# Patient Record
Sex: FEMALE | Race: BLACK OR AFRICAN AMERICAN | NOT HISPANIC OR LATINO | Employment: OTHER | URBAN - METROPOLITAN AREA
[De-identification: names, ages, dates, MRNs, and addresses within clinical notes are randomized per-mention and may not be internally consistent; named-entity substitution may affect disease eponyms.]

---

## 2017-01-25 ASSESSMENT — MIFFLIN-ST. JEOR
SCORE: 1473.43
SCORE: 1473.43

## 2017-01-26 ENCOUNTER — SURGERY - HEALTHEAST (OUTPATIENT)
Dept: CARDIOLOGY | Facility: CLINIC | Age: 67
End: 2017-01-26

## 2017-01-26 ASSESSMENT — MIFFLIN-ST. JEOR
SCORE: 1471.16
SCORE: 1471.16

## 2017-01-28 ASSESSMENT — MIFFLIN-ST. JEOR
SCORE: 1462.13
SCORE: 1462.13

## 2017-02-01 ENCOUNTER — AMBULATORY - HEALTHEAST (OUTPATIENT)
Dept: CARDIAC REHAB | Facility: CLINIC | Age: 67
End: 2017-02-01

## 2017-02-01 DIAGNOSIS — I21.4 NON-ST ELEVATION MI (NSTEMI) (H): ICD-10-CM

## 2017-02-14 ENCOUNTER — OFFICE VISIT - HEALTHEAST (OUTPATIENT)
Dept: CARDIOLOGY | Facility: CLINIC | Age: 67
End: 2017-02-14

## 2017-02-14 ENCOUNTER — AMBULATORY - HEALTHEAST (OUTPATIENT)
Dept: CARDIAC REHAB | Facility: CLINIC | Age: 67
End: 2017-02-14

## 2017-02-14 DIAGNOSIS — I25.83 CORONARY ARTERY DISEASE DUE TO LIPID RICH PLAQUE: ICD-10-CM

## 2017-02-14 DIAGNOSIS — I25.10 CORONARY ARTERY DISEASE DUE TO LIPID RICH PLAQUE: ICD-10-CM

## 2017-02-14 DIAGNOSIS — E78.2 MIXED HYPERLIPIDEMIA DUE TO TYPE 2 DIABETES MELLITUS (H): ICD-10-CM

## 2017-02-14 DIAGNOSIS — I21.4 NON-ST ELEVATION MI (NSTEMI) (H): ICD-10-CM

## 2017-02-14 DIAGNOSIS — I21.4 NSTEMI (NON-ST ELEVATED MYOCARDIAL INFARCTION) (H): ICD-10-CM

## 2017-02-14 DIAGNOSIS — E11.69 MIXED HYPERLIPIDEMIA DUE TO TYPE 2 DIABETES MELLITUS (H): ICD-10-CM

## 2017-02-14 RX ORDER — GLUCOSAMINE HCL/CHONDROITIN SU 500-400 MG
CAPSULE ORAL
Status: SHIPPED | COMMUNITY
Start: 2013-06-27

## 2017-02-14 RX ORDER — ASPIRIN 81 MG/1
81 TABLET, CHEWABLE ORAL DAILY
Status: SHIPPED | COMMUNITY
Start: 2017-02-14

## 2017-02-14 ASSESSMENT — MIFFLIN-ST. JEOR: SCORE: 1467.07

## 2017-02-16 ENCOUNTER — AMBULATORY - HEALTHEAST (OUTPATIENT)
Dept: CARDIAC REHAB | Facility: CLINIC | Age: 67
End: 2017-02-16

## 2017-02-16 DIAGNOSIS — I21.4 NON-ST ELEVATION MI (NSTEMI) (H): ICD-10-CM

## 2017-02-21 ENCOUNTER — AMBULATORY - HEALTHEAST (OUTPATIENT)
Dept: CARDIAC REHAB | Facility: CLINIC | Age: 67
End: 2017-02-21

## 2017-02-21 DIAGNOSIS — I21.4 NON-ST ELEVATION MI (NSTEMI) (H): ICD-10-CM

## 2017-02-22 ENCOUNTER — RECORDS - HEALTHEAST (OUTPATIENT)
Dept: ADMINISTRATIVE | Facility: OTHER | Age: 67
End: 2017-02-22

## 2017-02-28 ENCOUNTER — AMBULATORY - HEALTHEAST (OUTPATIENT)
Dept: CARDIAC REHAB | Facility: CLINIC | Age: 67
End: 2017-02-28

## 2017-02-28 DIAGNOSIS — I21.4 NON-ST ELEVATION MI (NSTEMI) (H): ICD-10-CM

## 2017-03-02 ENCOUNTER — AMBULATORY - HEALTHEAST (OUTPATIENT)
Dept: CARDIAC REHAB | Facility: CLINIC | Age: 67
End: 2017-03-02

## 2017-03-02 DIAGNOSIS — I21.4 NON-ST ELEVATION MI (NSTEMI) (H): ICD-10-CM

## 2017-03-02 DIAGNOSIS — Z98.62 S/P ANGIOPLASTY: ICD-10-CM

## 2017-03-07 ENCOUNTER — AMBULATORY - HEALTHEAST (OUTPATIENT)
Dept: CARDIAC REHAB | Facility: CLINIC | Age: 67
End: 2017-03-07

## 2017-03-07 DIAGNOSIS — I21.4 NON-ST ELEVATION MI (NSTEMI) (H): ICD-10-CM

## 2017-03-07 DIAGNOSIS — Z98.62 S/P ANGIOPLASTY: ICD-10-CM

## 2017-03-09 ENCOUNTER — AMBULATORY - HEALTHEAST (OUTPATIENT)
Dept: CARDIAC REHAB | Facility: CLINIC | Age: 67
End: 2017-03-09

## 2017-03-09 DIAGNOSIS — I21.4 NON-ST ELEVATION MI (NSTEMI) (H): ICD-10-CM

## 2017-03-09 DIAGNOSIS — Z98.62 S/P ANGIOPLASTY: ICD-10-CM

## 2017-03-14 ENCOUNTER — AMBULATORY - HEALTHEAST (OUTPATIENT)
Dept: CARDIAC REHAB | Facility: CLINIC | Age: 67
End: 2017-03-14

## 2017-03-14 DIAGNOSIS — I21.4 NON-ST ELEVATION MI (NSTEMI) (H): ICD-10-CM

## 2017-03-14 DIAGNOSIS — Z98.62 S/P ANGIOPLASTY: ICD-10-CM

## 2017-03-16 ENCOUNTER — AMBULATORY - HEALTHEAST (OUTPATIENT)
Dept: CARDIAC REHAB | Facility: CLINIC | Age: 67
End: 2017-03-16

## 2017-03-16 DIAGNOSIS — I21.4 NON-ST ELEVATION MI (NSTEMI) (H): ICD-10-CM

## 2017-03-16 DIAGNOSIS — Z98.62 S/P ANGIOPLASTY: ICD-10-CM

## 2017-03-28 ENCOUNTER — AMBULATORY - HEALTHEAST (OUTPATIENT)
Dept: CARDIAC REHAB | Facility: CLINIC | Age: 67
End: 2017-03-28

## 2017-03-28 DIAGNOSIS — I21.4 NON-ST ELEVATION MI (NSTEMI) (H): ICD-10-CM

## 2017-03-28 DIAGNOSIS — Z98.62 S/P ANGIOPLASTY: ICD-10-CM

## 2017-03-30 ENCOUNTER — AMBULATORY - HEALTHEAST (OUTPATIENT)
Dept: CARDIAC REHAB | Facility: CLINIC | Age: 67
End: 2017-03-30

## 2017-03-30 DIAGNOSIS — I21.4 NON-ST ELEVATION MI (NSTEMI) (H): ICD-10-CM

## 2017-03-30 DIAGNOSIS — Z98.62 S/P ANGIOPLASTY: ICD-10-CM

## 2017-04-04 ENCOUNTER — AMBULATORY - HEALTHEAST (OUTPATIENT)
Dept: CARDIAC REHAB | Facility: CLINIC | Age: 67
End: 2017-04-04

## 2017-04-04 DIAGNOSIS — I21.4 NON-ST ELEVATION MI (NSTEMI) (H): ICD-10-CM

## 2017-04-04 DIAGNOSIS — Z98.62 S/P ANGIOPLASTY: ICD-10-CM

## 2017-04-11 ENCOUNTER — AMBULATORY - HEALTHEAST (OUTPATIENT)
Dept: CARDIAC REHAB | Facility: CLINIC | Age: 67
End: 2017-04-11

## 2017-04-11 DIAGNOSIS — Z98.62 S/P ANGIOPLASTY: ICD-10-CM

## 2017-04-11 DIAGNOSIS — I21.4 NON-ST ELEVATION MI (NSTEMI) (H): ICD-10-CM

## 2017-04-13 ENCOUNTER — AMBULATORY - HEALTHEAST (OUTPATIENT)
Dept: CARDIAC REHAB | Facility: CLINIC | Age: 67
End: 2017-04-13

## 2017-04-13 DIAGNOSIS — I21.4 NON-ST ELEVATION MI (NSTEMI) (H): ICD-10-CM

## 2017-04-13 DIAGNOSIS — Z98.62 S/P ANGIOPLASTY: ICD-10-CM

## 2017-04-18 ENCOUNTER — AMBULATORY - HEALTHEAST (OUTPATIENT)
Dept: CARDIAC REHAB | Facility: CLINIC | Age: 67
End: 2017-04-18

## 2017-04-18 DIAGNOSIS — Z98.62 S/P ANGIOPLASTY: ICD-10-CM

## 2017-04-18 DIAGNOSIS — I21.4 NON-ST ELEVATION MI (NSTEMI) (H): ICD-10-CM

## 2017-04-25 ENCOUNTER — OFFICE VISIT - HEALTHEAST (OUTPATIENT)
Dept: CARDIOLOGY | Facility: CLINIC | Age: 67
End: 2017-04-25

## 2017-04-25 ENCOUNTER — AMBULATORY - HEALTHEAST (OUTPATIENT)
Dept: CARDIAC REHAB | Facility: CLINIC | Age: 67
End: 2017-04-25

## 2017-04-25 DIAGNOSIS — I21.4 NON-ST ELEVATION MI (NSTEMI) (H): ICD-10-CM

## 2017-04-25 DIAGNOSIS — Z98.62 S/P ANGIOPLASTY: ICD-10-CM

## 2017-04-25 DIAGNOSIS — I25.10 CORONARY ARTERY DISEASE INVOLVING NATIVE CORONARY ARTERY OF NATIVE HEART WITHOUT ANGINA PECTORIS: ICD-10-CM

## 2017-04-25 DIAGNOSIS — E11.69 MIXED HYPERLIPIDEMIA DUE TO TYPE 2 DIABETES MELLITUS (H): ICD-10-CM

## 2017-04-25 DIAGNOSIS — Z72.0 TOBACCO USE: ICD-10-CM

## 2017-04-25 DIAGNOSIS — E78.2 MIXED HYPERLIPIDEMIA DUE TO TYPE 2 DIABETES MELLITUS (H): ICD-10-CM

## 2017-04-25 ASSESSMENT — MIFFLIN-ST. JEOR: SCORE: 1459.36

## 2017-04-27 ENCOUNTER — AMBULATORY - HEALTHEAST (OUTPATIENT)
Dept: CARDIAC REHAB | Facility: CLINIC | Age: 67
End: 2017-04-27

## 2017-04-27 DIAGNOSIS — Z98.62 S/P ANGIOPLASTY: ICD-10-CM

## 2017-04-27 DIAGNOSIS — I21.4 NON-ST ELEVATION MI (NSTEMI) (H): ICD-10-CM

## 2017-05-04 ENCOUNTER — AMBULATORY - HEALTHEAST (OUTPATIENT)
Dept: CARDIAC REHAB | Facility: CLINIC | Age: 67
End: 2017-05-04

## 2017-05-04 DIAGNOSIS — I21.4 NON-ST ELEVATION MI (NSTEMI) (H): ICD-10-CM

## 2017-05-04 DIAGNOSIS — Z98.62 S/P ANGIOPLASTY: ICD-10-CM

## 2017-09-15 ENCOUNTER — COMMUNICATION - HEALTHEAST (OUTPATIENT)
Dept: CARDIOLOGY | Facility: CLINIC | Age: 67
End: 2017-09-15

## 2017-09-15 DIAGNOSIS — I25.10 CORONARY ATHEROSCLEROSIS OF NATIVE CORONARY ARTERY: ICD-10-CM

## 2019-03-28 ENCOUNTER — RECORDS - HEALTHEAST (OUTPATIENT)
Dept: ADMINISTRATIVE | Facility: OTHER | Age: 69
End: 2019-03-28

## 2019-03-28 ENCOUNTER — HOSPITAL ENCOUNTER (OUTPATIENT)
Dept: RADIOLOGY | Facility: CLINIC | Age: 69
Discharge: HOME | End: 2019-03-28
Attending: INTERNAL MEDICINE

## 2019-03-28 DIAGNOSIS — F17.210 CIGARETTE SMOKER: ICD-10-CM

## 2019-03-28 DIAGNOSIS — R05.9 COUGH: ICD-10-CM

## 2019-05-02 ENCOUNTER — RECORDS - HEALTHEAST (OUTPATIENT)
Dept: ADMINISTRATIVE | Facility: OTHER | Age: 69
End: 2019-05-02

## 2019-05-02 ENCOUNTER — HOSPITAL ENCOUNTER (OUTPATIENT)
Dept: RADIOLOGY | Facility: CLINIC | Age: 69
Discharge: HOME | End: 2019-05-02
Attending: INTERNAL MEDICINE

## 2019-05-02 DIAGNOSIS — J18.9 PNEUMONIA: ICD-10-CM

## 2019-05-02 DIAGNOSIS — Z09 FOLLOW UP: ICD-10-CM

## 2020-01-13 ENCOUNTER — COMMUNICATION - HEALTHEAST (OUTPATIENT)
Dept: SCHEDULING | Facility: CLINIC | Age: 70
End: 2020-01-13

## 2020-07-07 ENCOUNTER — OFFICE VISIT - HEALTHEAST (OUTPATIENT)
Dept: FAMILY MEDICINE | Facility: CLINIC | Age: 70
End: 2020-07-07

## 2020-07-07 DIAGNOSIS — I10 BENIGN ESSENTIAL HYPERTENSION: ICD-10-CM

## 2020-07-07 DIAGNOSIS — J44.9 CHRONIC OBSTRUCTIVE PULMONARY DISEASE, UNSPECIFIED COPD TYPE (H): ICD-10-CM

## 2020-07-07 DIAGNOSIS — J45.21 INTERMITTENT ASTHMA WITH ACUTE EXACERBATION, UNSPECIFIED ASTHMA SEVERITY: ICD-10-CM

## 2020-07-11 ENCOUNTER — AMBULATORY - HEALTHEAST (OUTPATIENT)
Dept: FAMILY MEDICINE | Facility: CLINIC | Age: 70
End: 2020-07-11

## 2020-07-11 DIAGNOSIS — J45.21 INTERMITTENT ASTHMA WITH ACUTE EXACERBATION, UNSPECIFIED ASTHMA SEVERITY: ICD-10-CM

## 2020-07-15 ENCOUNTER — COMMUNICATION - HEALTHEAST (OUTPATIENT)
Dept: FAMILY MEDICINE | Facility: CLINIC | Age: 70
End: 2020-07-15

## 2020-09-05 ENCOUNTER — COMMUNICATION - HEALTHEAST (OUTPATIENT)
Dept: SCHEDULING | Facility: CLINIC | Age: 70
End: 2020-09-05

## 2020-09-07 ENCOUNTER — SURGERY - HEALTHEAST (OUTPATIENT)
Dept: CARDIOLOGY | Facility: CLINIC | Age: 70
End: 2020-09-07

## 2020-09-07 ASSESSMENT — MIFFLIN-ST. JEOR
SCORE: 1373.17
SCORE: 1370.44

## 2020-09-08 ENCOUNTER — COMMUNICATION - HEALTHEAST (OUTPATIENT)
Dept: SCHEDULING | Facility: CLINIC | Age: 70
End: 2020-09-08

## 2020-09-08 ASSESSMENT — MIFFLIN-ST. JEOR: SCORE: 1384.51

## 2020-10-08 ENCOUNTER — OFFICE VISIT - HEALTHEAST (OUTPATIENT)
Dept: CARDIOLOGY | Facility: CLINIC | Age: 70
End: 2020-10-08

## 2020-10-08 DIAGNOSIS — E78.2 MIXED HYPERLIPIDEMIA: ICD-10-CM

## 2020-10-08 DIAGNOSIS — I25.10 CORONARY ARTERY DISEASE INVOLVING NATIVE CORONARY ARTERY OF NATIVE HEART, ANGINA PRESENCE UNSPECIFIED: ICD-10-CM

## 2020-10-08 DIAGNOSIS — I10 ESSENTIAL HYPERTENSION: ICD-10-CM

## 2020-10-08 RX ORDER — LORATADINE 10 MG/1
10 TABLET ORAL DAILY
Status: SHIPPED | COMMUNITY
Start: 2020-06-16

## 2020-10-08 ASSESSMENT — MIFFLIN-ST. JEOR: SCORE: 1390.4

## 2021-01-03 ENCOUNTER — RECORDS - HEALTHEAST (OUTPATIENT)
Dept: ADMINISTRATIVE | Facility: OTHER | Age: 71
End: 2021-01-03

## 2021-04-09 ENCOUNTER — TRANSFERRED RECORDS (OUTPATIENT)
Dept: MULTI SPECIALTY CLINIC | Facility: CLINIC | Age: 71
End: 2021-04-09

## 2021-04-09 LAB — HBA1C MFR BLD: 5.9 %

## 2021-05-18 ENCOUNTER — OFFICE VISIT - HEALTHEAST (OUTPATIENT)
Dept: FAMILY MEDICINE | Facility: CLINIC | Age: 71
End: 2021-05-18

## 2021-05-18 DIAGNOSIS — R05.9 COUGH: ICD-10-CM

## 2021-05-18 DIAGNOSIS — M62.838 MUSCLE SPASM: ICD-10-CM

## 2021-05-18 LAB
ANION GAP SERPL CALCULATED.3IONS-SCNC: 13 MMOL/L (ref 5–18)
BUN SERPL-MCNC: 12 MG/DL (ref 8–28)
CALCIUM SERPL-MCNC: 8.7 MG/DL (ref 8.5–10.5)
CHLORIDE BLD-SCNC: 104 MMOL/L (ref 98–107)
CO2 SERPL-SCNC: 24 MMOL/L (ref 22–31)
CREAT SERPL-MCNC: 0.87 MG/DL (ref 0.6–1.1)
GFR SERPL CREATININE-BSD FRML MDRD: >60 ML/MIN/1.73M2
GLUCOSE BLD-MCNC: 168 MG/DL (ref 70–125)
POTASSIUM BLD-SCNC: 3.8 MMOL/L (ref 3.5–5)
SODIUM SERPL-SCNC: 141 MMOL/L (ref 136–145)

## 2021-05-25 ENCOUNTER — RECORDS - HEALTHEAST (OUTPATIENT)
Dept: ADMINISTRATIVE | Facility: CLINIC | Age: 71
End: 2021-05-25

## 2021-05-29 ENCOUNTER — RECORDS - HEALTHEAST (OUTPATIENT)
Dept: ADMINISTRATIVE | Facility: CLINIC | Age: 71
End: 2021-05-29

## 2021-05-30 ENCOUNTER — RECORDS - HEALTHEAST (OUTPATIENT)
Dept: ADMINISTRATIVE | Facility: CLINIC | Age: 71
End: 2021-05-30

## 2021-05-30 VITALS — WEIGHT: 217 LBS | BODY MASS INDEX: 38.44 KG/M2

## 2021-05-30 VITALS — WEIGHT: 221.4 LBS | BODY MASS INDEX: 39.22 KG/M2

## 2021-05-30 VITALS — BODY MASS INDEX: 39.33 KG/M2 | WEIGHT: 222 LBS

## 2021-05-30 VITALS — BODY MASS INDEX: 38.65 KG/M2 | WEIGHT: 218.2 LBS

## 2021-05-30 VITALS — WEIGHT: 220.8 LBS | BODY MASS INDEX: 39.11 KG/M2

## 2021-05-30 VITALS — WEIGHT: 217.4 LBS | BODY MASS INDEX: 38.51 KG/M2

## 2021-05-30 VITALS — WEIGHT: 218 LBS | BODY MASS INDEX: 38.62 KG/M2

## 2021-05-30 VITALS — BODY MASS INDEX: 39.25 KG/M2 | WEIGHT: 221.6 LBS

## 2021-05-30 VITALS
WEIGHT: 215.61 LBS | HEIGHT: 63 IN | HEIGHT: 63 IN | BODY MASS INDEX: 38.2 KG/M2 | WEIGHT: 215.61 LBS | BODY MASS INDEX: 38.2 KG/M2

## 2021-05-30 VITALS — WEIGHT: 222.2 LBS | BODY MASS INDEX: 39.36 KG/M2

## 2021-05-30 VITALS — BODY MASS INDEX: 38.44 KG/M2 | WEIGHT: 217 LBS

## 2021-05-30 VITALS — BODY MASS INDEX: 37.55 KG/M2 | WEIGHT: 212 LBS

## 2021-05-30 VITALS — WEIGHT: 216.6 LBS | BODY MASS INDEX: 38.37 KG/M2

## 2021-05-30 VITALS — WEIGHT: 216.4 LBS | BODY MASS INDEX: 38.33 KG/M2

## 2021-05-30 VITALS — BODY MASS INDEX: 38.86 KG/M2 | WEIGHT: 219.4 LBS

## 2021-05-30 VITALS — BODY MASS INDEX: 38.39 KG/M2 | WEIGHT: 216.7 LBS

## 2021-05-30 VITALS — HEIGHT: 63 IN | WEIGHT: 216.7 LBS | BODY MASS INDEX: 38.39 KG/M2

## 2021-05-30 VITALS — WEIGHT: 215 LBS | HEIGHT: 63 IN | BODY MASS INDEX: 38.09 KG/M2

## 2021-05-30 VITALS — BODY MASS INDEX: 38.79 KG/M2 | WEIGHT: 219 LBS

## 2021-05-30 VITALS — BODY MASS INDEX: 38.33 KG/M2 | WEIGHT: 216.4 LBS

## 2021-06-01 ENCOUNTER — RECORDS - HEALTHEAST (OUTPATIENT)
Dept: ADMINISTRATIVE | Facility: CLINIC | Age: 71
End: 2021-06-01

## 2021-06-02 ENCOUNTER — RECORDS - HEALTHEAST (OUTPATIENT)
Dept: ADMINISTRATIVE | Facility: CLINIC | Age: 71
End: 2021-06-02

## 2021-06-05 VITALS — BODY MASS INDEX: 36.21 KG/M2 | WEIGHT: 185 LBS | HEIGHT: 63 IN | BODY MASS INDEX: 32.77 KG/M2

## 2021-06-05 NOTE — TELEPHONE ENCOUNTER
Seen earlier in ED and they discussed medication and SE     Can't tolerate medication, stomach bloating, not sleeping, Cannot pass gas or stool, Nauseated     Metronidazole and ciprofloxacin     Has had this medication before and it doesn't agree with her    Cannot handle this medication, what medication can she take to counteract it.     Attempted to give the patient Home Care Advice on ways to help with symptoms. Patient states she wants something else to take to help with it.     Triaged to a disposition of Call [facility]. Explained to patient that she will need to either notify her primary care's office or go back to ED.     Reason for Disposition    Caller has URGENT medication question about med that PCP prescribed and triager unable to answer question    Protocols used: MEDICATION QUESTION CALL-A-    Jordana Davila RN Triage Nurse Advisor

## 2021-06-08 NOTE — PROGRESS NOTES
ITP ASSESSMENT   Assessment Day: Initial  Session Number: 1  Precautions: L hand, L leg,   Diagnosis: MI;Other (angioplasty)  Risk Stratification: High  Referring Provider: Dr. Anderson  ITP sent to DR. Anderson, and Chitra Ricardo CNP  EXERCISE  Exercise Assessment: Initial  6 Minute Walk Test   Pre   Pre Exercise HR: 71                  Pre Exercise BP: 122/60   Blood sugar: 125  Peak  Peak HR: 85                 Peak BP: 140/60  Peak feet: 700  Peak O2 SAT: 92  Peak RPE: 14  Peak MPH: 1.33  Symptoms:  Peak Symptoms: hips 4/10  5 mins. Post  5 Min Post HR: 73  5 Min Post BP: 118/64   Blood sugar: 103                         Exercise Plan  Goals Next 30 days  ADL'S: resume light cleaning such as wipe down counters  Leisure: walking 10 min. x 2, 2-3 days/week  Work: disabled    Education Goals: Patient can state cardiac s/s and appropriate emergency response.    Exercise Prescription  Exercise Mode: Treadmill;Bike;Nustep;Hallway Walking  Frequency: 2x/week  Duration: 20-30 min.  Intensity / THR: 20-30 beats above resting heart rate  RPE 11-14  Progression / Met level: 2.1-2.5  Resistive Training?: No  Current Exercise (mins/week): 70    Interventions  Home Exercise:  Mode: walking  Frequency: daily  Duration: 10 min.  Education Material : Individual education and counseling    Education Completed  Exercise Education Completed: Cardiac Anatomy;Signs and Symptoms;RPE;Emergency Plan;Home Exercise;Warm up/cool down;FITT Principles;BP/HR Reponse to exercise;Benefits of Exercise;End point of exercise NUTRITION  Nutrition Assessment: Initial    Nutrition Risk Factors:  Nutrition Risk Factors: Diabetes;Dyslipidemia;Overweight  HbA1c: 7.3  Monitors blood sugar at home: Yes  Frequency: 2x/day  Cholesterol: 205  LDL: 113  HDL: 25  Triglycerides: 334    Nutrition Plan  Interventions  Nutrition Interventions: Therapist/Patient discussion    Goals  Nutrition Goals (Next 30 days): Patient can identify their risk factors for  "CAD;Provide Rate your Plate Survey;Review Dietitian schedule    Height, Weight, and  BMI  Weight: 216 lb 6.4 oz (98.2 kg)  Height: 5' 3\" (1.6 m)  BMI: 38.34    Nutrition Follow-up  Follow-up/Discharge: Plan to meet with dietician       Other Risk Factors  Other Risk Factor Assessment: Initial    HTN Risk Factor: Hypertension  Pre Exercise BP: 122/60  Post Exercise BP: 118/64    Hypertension Plan  Goals  HTN Goals: Exercises regularly    HTN Interventions  HTN Interventions: Therapist/patient discussion    Tobacco Risk Factor: Tobacco    Current Use:: none, quit 1/26/2017    Tobacco Plan  Tobacco Goals  Tobacco Goals: Patient remain tobacco free    Goals Met  Tobacco Goals Met: Patient remain tobacco free    Tobacco Interventions  Tobacco Interventions: Therapist/patient discussion    Risk Factor Follow-up   Follow-up/Discharge: Remain smoke free, quit 1/26/2017 PSYCHOSOCIAL  Psychosocial Assessment: Initial     [place] COOP Q of L Summary Score: 30  EDELMIRA-D Score: 24    Psychosocial Risk Factor: NA    Psychosocial Plan  Interventions  If EDELMIRA-D > 15 send letter to MD    Education Completed  Education Completed: Relaxation/Coping Techniques    Goals  Goals (Next 30 days): Identified Support system    Psychosocial Follow-up  Follow-up/Discharge: Patient denies stress, does have emotional things, sees a psychiatrist           Patient involved in Goal setting?: Yes    Signature: _____________________________________________________________    Date: __________________    Time: __________________  "

## 2021-06-08 NOTE — PROGRESS NOTES
Assessment/Plan:     1.  Coronary artery disease: She was hospitalized January 25 to January 28 with a non-STEMI.  Coronary angiogram showed patent LIMA to LAD, patent SVG to PL branch, and 99% stenosis of SVG to OM.  She refused stent placement but had PTCA of SVG to OM with good results.  It was recommended that she continue Plavix for at least 2 weeks but ideally 12 months.  She has allergic reactions to colored medications and was able to tolerate Plavix for 2 weeks but has discontinued.  She is now taking aspirin 325 mg daily.  She denies any abnormal bruising or bleeding.  There was discussion of possible consult in [place] for biodegradable scaffold stent following PCI.  She is not interested in this at this time.      Risk factor modification and lifestyle management topics were discussed including managing comorbidities, weight loss, heart healthy diet, exercise and smoking cessation.  She is participating in cardiac rehab.    2.  Dyslipidemia: Bárbara Perez is on atorvastatin 10 mg daily.  She was previously unable to tolerate this medication but has since been able to obtain a white pill (allergic to dye in the past).  She denies any side effects from taking atorvastatin.  She is also taking Zetia.  We discussed a diet low in saturated fat, weight loss, and exercise along with medication for better control of cholesterol.     3.  Tobacco use: She has quit smoking since PCI.    Bárbara will follow up with her primary care provider next week.  She will follow-up with Dr. Sol in 1-2 months.    Subjective:     Bárbara Perez is seen at [facility] for post coronary intervention follow up.  She has a history of coronary artery bypass surgery in 2010, dyslipidemia, hypertension, and diabetes.  She was hospitalized January 25 to January 28 with a non-STEMI.  Coronary angiogram showed patent LIMA to LAD, patent SVG to PL branch, and 99% stenosis of SVG to OM.  She refused stent placement but had  PTCA of SVG to OM with good results.  It was recommended that she continue Plavix for at least 2 weeks but ideally 12 months.  She has allergic reactions to colored medications and was able to tolerate Plavix for 2 weeks but has discontinued.  She is now taking aspirin 325 mg daily.    She denies any chest pain.  She has chronic joint and muscle pains but denies any worsening.  Much of today's visit was clarifying medications and plan of care.  She denies lightheadedness, shortness of breath and lower extremity edema.  She is no longer requiring oxygen.    Review of Systems:   General: WNL  Eyes: WNL  Ears/Nose/Throat: WNL  Lungs: WNL  Heart: WNL  Stomach: WNL  Bladder: WNL  Muscle/Joints: WNL  Skin: WNL  Nervous System: WNL  Mental Health: WNL     Blood: WNL     Patient Active Problem List   Diagnosis     Coronary artery disease due to lipid rich plaque     GERD (gastroesophageal reflux disease)     Obese     UTI (urinary tract infection)     Mixed hyperlipidemia due to type 2 diabetes mellitus     NSTEMI (non-ST elevated myocardial infarction)     S/P CABG x 3       Past Medical History:   Diagnosis Date     Asthma      CAD (coronary artery disease)      Chest pain      COPD (chronic obstructive pulmonary disease)      Diabetes mellitus      GERD (gastroesophageal reflux disease)      Heart attack      HTN (hypertension)      Hypothyroidism      Psoriasis      Seasonal allergies      Sternum pain     Following Cardiac Bypass       Past Surgical History:   Procedure Laterality Date     APPENDECTOMY       CARDIAC CATHETERIZATION  01/26/2017     CARDIAC CATHETERIZATION N/A 1/26/2017    Procedure: Coronary Angiogram;  Surgeon: Arsen Anderson MD;  Location: [facility] Cath Lab;  Service:      CARDIAC SURGERY  2008    Triple Bypass     HYSTERECTOMY      age 29's     WRIST SURGERY Left     Ligament Surgery       Family History   Problem Relation Age of Onset     Arthritis Mother      Kidney disease Father       Heart disease Sister      Colon cancer Brother      Cancer Maternal Aunt        Social History     Social History     Marital status:      Spouse name: N/A     Number of children: N/A     Years of education: N/A     Occupational History     Not on file.     Social History Main Topics     Smoking status: Current Every Day Smoker     Packs/day: 1.00     Years: 30.00     Smokeless tobacco: Not on file      Comment: Smoking Cessation Packet provided     Alcohol use No     Drug use: No     Sexual activity: No     Other Topics Concern     Not on file     Social History Narrative    Patient presented to the ED with her  with a complaint of pain and cough 01/22/17           Current Outpatient Prescriptions   Medication Sig Dispense Refill     acetaminophen (TYLENOL) 325 MG tablet Take 650 mg by mouth 2 (two) times a day.       albuterol (PROVENTIL) 5 mg/mL nebulizer solution Take 0.5 mL (2.5 mg total) by nebulization every 6 (six) hours as needed for wheezing. 20 mL 0     aspirin 325 MG tablet Take 325 mg by mouth daily.       atorvastatin (LIPITOR) 10 MG tablet Take 1 tablet (10 mg total) by mouth bedtime. 90 tablet 0     blood glucose test (ACCU-CHEK ACTIVE TEST) strips Use 1 each As Directed as needed. Dispense brand per patient's insurance at pharmacy discretion. 100 each 11     blood glucose test (GLUCOSE BLOOD) strips Test once daily       compressor, for nebulizer Mellissa To use with albuterol as needed for wheezing and shortness of breath 1 Device 0     diphenhydrAMINE (BENADRYL) 25 mg capsule Take 25 mg by mouth bedtime as needed for sleep.       ezetimibe (ZETIA) 10 mg tablet Take 1 tablet (10 mg total) by mouth bedtime. 30 tablet 0     famotidine (PEPCID) 20 MG tablet Take 1 tablet (20 mg total) by mouth 2 (two) times a day. 45 tablet 0     fluticasone (FLONASE) 50 mcg/actuation nasal spray 2 sprays.       generic lancets (ACCU-CHEK MULTICLIX LANCET) TESTING ONCE DAILY       glipiZIDE (GLUCOTROL) 5 MG  tablet Take 0.5 tablets (2.5 mg total) by mouth daily.  0     levothyroxine (SYNTHROID, LEVOTHROID) 50 MCG tablet Take 150 mcg by mouth Daily at 6:00 am.        loratadine (CLARITIN) 10 mg tablet Take 10 mg by mouth daily as needed.        metoprolol tartrate (LOPRESSOR) 25 MG tablet Take 1 tablet (25 mg total) by mouth daily with breakfast. 90 tablet 0     metoprolol tartrate (LOPRESSOR) 25 MG tablet Take 0.5 tablets (12.5 mg total) by mouth every evening. 45 tablet 0     nitroglycerin (NITROSTAT) 0.4 MG SL tablet Place 1 tablet (0.4 mg total) under the tongue every 5 (five) minutes as needed for chest pain. 30 tablet 0     potassium gluconate 550 mg (90 mg) tablet Take by mouth.       PROAIR HFA 90 mcg/actuation inhaler Inhale 1-2 puffs every 6 (six) hours as needed for wheezing or shortness of breath.        sulfamethoxazole-trimethoprim (SEPTRA DS) 800-160 mg per tablet TK 1 T PO BID FOR 7 DAYS  0     triamcinolone (KENALOG) 0.1 % cream Apply 1 application topically 2 (two) times a day as needed. Dermatitis on hand       No current facility-administered medications for this visit.        Allergies   Allergen Reactions     Latex Anaphylaxis     Other Drug Allergy (See Comments)      Allergy to all dyes.  Cannot take any medications with dyes or color. Causes rapid heartrate, sweating.  Patient unsure of other symptoms but states they are severe.     Penicillin V Shortness Of Breath and Rash     Cetirizine      unknown     Chocolate Laxative [Sennosides]      Codeine Unknown     Egg Derived      Fenofibrate      Influenza Virus Vaccines      Iodine      Ioxaglate Sodium Itching     Latex      Peanut Oil      Toradol [Ketorolac]      Ulcers, takes aspirin daily at home and Aleve as needed       Objective:     Vitals:    02/14/17 1448   BP: 134/50   Pulse: 69   SpO2: 92%     Body mass index is 38.39 kg/(m^2).      General Appearance:   Alert, cooperative and in no acute distress.   HEENT:  No scleral icterus; the  mucous membranes were pink and moist.   Chest: The spine was straight. The chest was symmetric.   Lungs:   Respirations unlabored; the lungs are clear to auscultation.   Cardiovascular:   Regular rhythm. S1 and S2 without murmur, clicks or rubs. Carotid pulses are intact and symmetrical.  No carotid bruits noted.   Abdomen:  Soft, nontender, nondistended, bowel sounds present   Extremities: No cyanosis, clubbing, or edema.   Skin: No xanthelasma.   Neurologic: Mood and affect are appropriate.   Puncture site:  she reports right femoral site is soft with no bruising.  Radial pulses and Pedal pulses intact and symmetrical.  CMS intact.         Lab Review   Lab Results   Component Value Date    CREATININE 0.84 01/28/2017    BUN 9 01/28/2017     01/28/2017    K 4.0 01/28/2017     01/28/2017    CO2 28 01/28/2017     CREATININE (mg/dL)   Date Value   01/28/2017 0.84   01/27/2017 0.75   01/26/2017 0.80   01/25/2017 0.79         40 minutes were spent with the patient with greater than 50% spent on education and counseling.      Dhara Tinoco, [facility]
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
## 2021-06-09 NOTE — PROGRESS NOTES
ITP ASSESSMENT   Assessment Day: 30 Day  Session Number: 8  Precautions: L wrist, L leg, L shoulder, hips  Diagnosis: MI;Other (angioplasty)  Risk Stratification: High  Referring Provider: Chitra Ricardo CNP   ITP sent to Dr. Sol  EXERCISE  Exercise Assessment: Reassessment                       Exercise Plan  Goals Next 30 days  ADL'S: Resume household duties with more strength and endurance, longer time standing while doing dishes with less fatigue  Leisure: Continue walking 10-15 min. x 2-3, 2-3x/week  Work: Disabled    Education Goals: Medication review (unsure about meds. from Epic and list from PMD)  Education Goals Met: Patient can state cardiac s/s and appropriate emergency response.;Has system for taking medication.                        Goals Met  Initial ADL's goals met: Resumed light cleaning off counters.  Initial Leisure goals met: Resumed walking 10 min. x 2, 2-3days/week  Intial Work goals met: Disabled    Exercise Prescription  Exercise Mode: Treadmill;Bike;Nustep;Hallway Walking;Arm Erg.  Frequency: 2x/week  Duration: 30-40 min.  Intensity / THR: 20-30 beats above resting heart rate  RPE 11-14  Progression / Met level: 2.6-3  Resistive Training?: No    Current Exercise (mins/week): 120    Interventions  Home Exercise:  Mode: walking  Frequency: 2-3x/week  Duration: 10min. x 2-3    Education Material : Individual education and counseling    Education Completed  Exercise Education Completed: Cardiac Anatomy;Signs and Symptoms;RPE;Emergency Plan;Home Exercise;Warm up/cool down;FITT Principles;BP/HR Reponse to exercise;Benefits of Exercise;End point of exercise            Exercise Follow-up/Discharge  Follow up/Discharge: Continue to walk for home exercise NUTRITION  Nutrition Assessment: Reassessment  Nutrition Risk Factors:  Nutrition Risk Factors: Diabetes;Dyslipidemia;Overweight  HbA1c: 7.3  Monitors blood sugar at home: Yes  Frequency: 1-2x/day  Cholesterol: 205  LDL: 113  HDL:  "25  Triglycerides: 334    Nutrition Plan  Interventions  Nutrition Interventions: Therapist/Patient discussion  Rate Your Plate Score: 58    Education Completed  Nutrition Education Completed: Low Saturated fat diet    Goals  Nutrition Goals (Next 30 days): Patient can identify their risk factors for CAD;Provide Rate your Plate Survey;Review Dietitian schedule    Goals Met  Nutrition Goals Met: Patient can identify their risk factors for CAD;Provided Rate your Plate Survey    Height, Weight, and  BMI  Weight: 221 lb 9.6 oz (100.5 kg)  Height: 5' 3\" (1.6 m)  BMI: 39.26    Nutrition Follow-up  Follow-up/Discharge: Patient declined to meet with dietician, states that she has met with one in the past     Other Risk Factors  Other Risk Factor Assessment: Reassessment    HTN Risk Factor: Hypertension (per chart, pt. hank)    Pre Exercise BP: 136/54  Post Exercise BP: 126/70    Hypertension Plan  Goals  HTN Goals: Follow low sodium diet    Goals Met  HTN Goals Met: Exercises regularly    HTN Interventions  HTN Interventions: Therapist/patient discussion    Tobacco Risk Factor: Tobacco    Initial Use:: none, quit 1/26/2017  Current Use:: none    Tobacco Plan  Tobacco Goals  Tobacco Goals: Patient remain tobacco free    Goals Met  Tobacco Goals Met: Patient remain tobacco free    Tobacco Interventions  Tobacco Interventions: Therapist/patient discussion    Risk Factor Follow-up   Follow-up/Discharge: Remain smoke free, quit 1/26/2017   PSYCHOSOCIAL  Psychosocial Assessment: Reassessment     [place] COOP Q of L Summary Score: 30  EDELMIRA-D Score: 24    Psychosocial Risk Factor: NA    Psychosocial Plan  Interventions  Interventions: Individual education and counseling    Education Completed  Education Completed: Relaxation/Coping Techniques    Goals  Goals (Next 30 days): Oriented to stress management classes    Goals Met  Goals Met: Identified Support system    Psychosocial Follow-up  Follow-up/Discharge: Patient denies " stress, does see a psychiatrist for emotional          Patient involved in Goal setting?: Yes    Signature: _____________________________________________________________    Date: __________________    Time: __________________

## 2021-06-10 NOTE — PROGRESS NOTES
Bárbara RYDER NewmanPerez has participated in 16 sessions of Phase II Cardiac Rehab.    Progress Report:   Cardiac Rehab Treatment Progress Report 4/4/2017 4/11/2017 4/13/2017 4/18/2017 4/25/2017   Weight 217 lbs 217 lbs 6 oz 219 lbs 217 lbs 216 lbs 11 oz   Pre Exercise  HR 68 73 79 71 75   Pre Exercise /70 142/80 110/66 126/64 122/64   Pre Blood Sugar (mg/dl) NA - NA N/A na   Treadmill Peak HR 83 85 85 85 93   Nustep Peak Heart Rate 79 83 80-84 78-82 84   Nustep Peak Blood Pressure 138/70 132/72 132/76 152/80 138/60   Heart Rate 73 75 72 73 75   Post Exercise /70 120/82 118/62 122/80 118/70   Post Blood Sugar (mg/dl) - - - - -   ECG SR SR SR with 1- PVC SR w/isolated PVC SR   Total Exercise Minutes 35 35 35 35 35         Current Status:  Therapists Comments: Patient does have hip pain with exercise, denies chest pain with exercise.  Overall patient has been doing well with cardiac rehab.    If Physician recommends change in treatment plan, please place orders.        __________________________________________________      _____________  Signature                                                                                                  Date/TIme

## 2021-06-10 NOTE — PROGRESS NOTES
ITP ASSESSMENT   Assessment Day: 60 Day  Session Number: 13  Precautions: L wrist, L leg, L shoulder, hips  Diagnosis: MI;Other (angioplasty)  Risk Stratification: High  Referring Provider: Chitra Ricardo CNP   Cardiologist: Sydni Sol MD  EXERCISE  Exercise Assessment: Reassessment                          Exercise Plan  Goals Next 30 days  ADL'S: resume additional household tasks without fatigue  Leisure: continue home walking program without fatigue disabled  Work: disabled    Education Goals: All goals in this section met  Education Goals Met: Patient can state cardiac s/s and appropriate emergency response.;Has system for taking medication.;Medication review.                        Goals Met  30 day ADL'S goals met: Tolerating folding clothes, light-moderate house hold tasks, unable to do sweeping due to wrist  30 day Leisure goals met: walking 20-30 min at home  30 day Work goals met: Disabled  30 Day Progression: pt reports she has more stamina to be more active at home    Initial ADL's goals met: Resumed light cleaning off counters.  Initial Leisure goals met: Resumed walking 10 min. x 2, 2-3days/week  Intial Work goals met: Disabled      Exercise Prescription  Exercise Mode: Treadmill;Bike;Nustep;Hallway Walking;Arm Erg.  Frequency: 2x/week  Duration: 30-40 min  Intensity / THR: 20-30 beats above resting heart rate  RPE 11-14  Progression / Met level: 2.6-3  Resistive Training?: No    Current Exercise (mins/week): 150    Interventions  Home Exercise:  Mode: walking  Frequency: 3x/week  Duration: 20-30 min    Education Material : Individual education and counseling    Education Completed  Exercise Education Completed: Cardiac Anatomy;Signs and Symptoms;RPE;Emergency Plan;Home Exercise;Warm up/cool down;FITT Principles;BP/HR Reponse to exercise;Benefits of Exercise;End point of exercise            Exercise Follow-up/Discharge  Follow up/Discharge: Continue to walk for home exercise NUTRITION  Nutrition  "Assessment: Reassessment    Nutrition Risk Factors:  Nutrition Risk Factors: Diabetes;Dyslipidemia;Overweight  Monitors blood sugar at home: Yes  Frequency: 1-2x/day    Nutrition Plan  Interventions  Nutrition Interventions: Therapist/Patient discussion  Rate Your Plate Score: 58 (final)    Education Completed  Nutrition Education Completed: Low Saturated fat diet;Low sodium diet    Goals Met  Nutrition Goals Met: Patient can identify their risk factors for CAD;Patient states following a low saturated fat diet;Patient follows a low sodium diet    Height, Weight, and  BMI  Weight: 217 lb 6.4 oz (98.6 kg)  Height: 5' 3\" (1.6 m)  BMI: 38.52    Nutrition Follow-up  Follow-up/Discharge: Patient declined to meet with dietician, states that she has met with one in the past       Other Risk Factors  Other Risk Factor Assessment: Reassessment    HTN Risk Factor: Hypertension    Pre Exercise BP: 142/80  Post Exercise BP: 118/70    Hypertension Plan  Goals  HTN Goals: Patient demonstrates understanding of HTN, no goals identified for the next 30 days    Goals Met  HTN Goals Met: Exercises regularly;Take medication as prescribed;Follow low sodium diet    HTN Interventions  HTN Interventions: Therapist/patient discussion      Tobacco Risk Factor: Tobacco    Initial Use:: none, quit 1/26/2017  Current Use:: none    Tobacco Plan  Tobacco Goals  Tobacco Goals: Patient remain tobacco free    Goals Met  Tobacco Goals Met: Patient remain tobacco free    Tobacco Interventions  Tobacco Interventions: Therapist/patient discussion      Risk Factor Follow-up   Follow-up/Discharge: Remain smoke free, quit 1/26/2017   PSYCHOSOCIAL  Psychosocial Assessment: Reassessment     Psychosocial Risk Factor: NA    Psychosocial Plan  Interventions  Interventions: Individual education and counseling    Education Completed  Education Completed: Relaxation/Coping Techniques    Goals  Goals (Next 30 days): Patient demonstrates understanding of stress, no " goals identified for the next 30 days    Goals Met  Goals Met: Identified Support system;Oriented to stress management classes;Improvement in [employer] CO[employer] score;Practicing stress management skills    Psychosocial Follow-up  Follow-up/Discharge: Patient denies stress, does see a psychiatrist for emotional            Patient involved in Goal setting?: Yes    Signature: _____________________________________________________________    Date: __________________    Time: __________________

## 2021-06-10 NOTE — PROGRESS NOTES
ITP ASSESSMENT   Assessment Day: Last Day  Session Number: 18  Precautions: L wrist, L leg, L shoulder, hips  Diagnosis: MI;Other (angioplasty)  Risk Stratification: High  Referring Provider: Chitra Ricardo CNP   Cardiologist: Sydni Sol MD  EXERCISE  Exercise Assessment: Discharge     6 Minute Walk Test   Pre   Pre Exercise HR: 70                  Pre Exercise BP: 112/64    Peak  Peak HR: 92                 Peak BP: 130/80  Peak feet: 950  Peak O2 SAT: 94  Peak RPE: 13  Peak MPH: 1.8    Symptoms:  Peak Symptoms: fatigue    5 mins. Post  5 Min Post HR: 75  5 Min Post BP: 120/60                         Exercise Plan  Goals Next 30 days  ADL'S: resume additional household tasks without fatigue  Leisure: continue home walking program without fatigue disabled  Work: disabled    Education Goals: All goals in this section met  Education Goals Met: Patient can state cardiac s/s and appropriate emergency response.;Has system for taking medication.;Medication review.                        Goals Met  60 day ADL'S goals met: tolerating household tasks that she needs to do  60 day Leisure goals met: walking 30 min most days of the week  60 day Work goals met: disabled  60 Day Progression: Pt improved 6' walk distance by 250'    30 day ADL'S goals met: Tolerating folding clothes, light-moderate house hold tasks, unable to do sweeping due to wrist  30 day Leisure goals met: walking 20-30 min at home  30 day Work goals met: Disabled  30 Day Progression: pt reports she has more stamina to be more active at home    Initial ADL's goals met: Resumed light cleaning off counters.  Initial Leisure goals met: Resumed walking 10 min. x 2, 2-3days/week  Intial Work goals met: Disabled  No Data Recorded    Exercise Prescription  Exercise Mode: Treadmill;Bike;Nustep;Hallway Walking;Arm Erg.  Frequency: 2x/week  Duration: 30-40 min  Intensity / THR: 20-30 beats above resting heart rate  RPE 11-14  Progression / Met level: 2.6-3  Resistive  "Training?: No    Current Exercise (mins/week): 200    Interventions  Home Exercise:  Mode: walking, seated calisthenics/sretching  Frequency: 5-6 days/week  Duration: 30-45 min    Education Material : Individual education and counseling    Education Completed  Exercise Education Completed: Cardiac Anatomy;Signs and Symptoms;RPE;Emergency Plan;Home Exercise;Warm up/cool down;FITT Principles;BP/HR Reponse to exercise;Benefits of Exercise;End point of exercise            Exercise Follow-up/Discharge  Follow up/Discharge: discharge NUTRITION  Nutrition Assessment: Discharge    Nutrition Risk Factors:  Nutrition Risk Factors: Diabetes;Dyslipidemia;Overweight  Monitors blood sugar at home: Yes  Frequency: 1-2x/day    Nutrition Plan  Interventions  Nutrition Interventions: Therapist/Patient discussion  Rate Your Plate Score: 58 (final)    Education Completed  Nutrition Education Completed: Low Saturated fat diet;Low sodium diet;Weight management      Goals Met  Nutrition Goals Met: Patient follows a low sodium diet;Patient states following a low saturated fat diet    Height, Weight, and  BMI  Weight: 212 lb (96.2 kg)  Height: 5' 3\" (1.6 m)  BMI: 37.56         Other Risk Factors  Other Risk Factor Assessment: Discharge    HTN Risk Factor: Hypertension    Pre Exercise BP: 112/64  Post Exercise BP: 122/72    Hypertension Plan  Goals  HTN Goals: Patient demonstrates understanding of HTN, no goals identified for the next 30 days    Goals Met  HTN Goals Met: Exercises regularly;Take medication as prescribed;Follow low sodium diet      Tobacco Risk Factor: Tobacco         PSYCHOSOCIAL  Psychosocial Assessment: Discharge     Heath CASTILLO Q of L Summary Score: 20    EDELMIRA-D Score: 18    Psychosocial Risk Factor: NA        Goals  Goals (Next 30 days): Patient demonstrates understanding of stress, no goals identified for the next 30 days    Goals Met  Goals Met: Improvement in [employer] CO[employer] score               Patient involved in " Goal setting?: Yes  Pt verbalizes understanding of home program instructions.    Signature: _____________________________________________________________    Date: __________________    Time: __________________

## 2021-06-11 NOTE — TELEPHONE ENCOUNTER
Quit taking her antibiotics secondary to her throat hurts and is sore; and Bárbara feels that she is having an allergic reaction to them.  Taking abx for pneumonia.  Did not finish her course.   Now, continues to have sore throat, vaginitis, and mild shoulder pain, which was her symptom which diagnosed the pneumonia.  Advised to see her PCP or UC for follow up for a different antibiotic, or to assure that her pneumonia has cleared.  Bárbara states that she will go to walk in clinic.     Reason for Disposition    [1] Swallowing difficulty AND [2] cause unknown (Exception: difficulty swallowing is a chronic symptom)    Protocols used: SWALLOWING DIFFICULTY-A-

## 2021-06-15 PROBLEM — E11.69 MIXED HYPERLIPIDEMIA DUE TO TYPE 2 DIABETES MELLITUS (H): Status: ACTIVE | Noted: 2017-01-25

## 2021-06-15 PROBLEM — E78.2 MIXED HYPERLIPIDEMIA DUE TO TYPE 2 DIABETES MELLITUS (H): Status: ACTIVE | Noted: 2017-01-25

## 2021-06-15 PROBLEM — N39.0 UTI (URINARY TRACT INFECTION): Status: ACTIVE | Noted: 2017-01-22

## 2021-06-16 PROBLEM — R07.9 CHEST PAIN: Status: ACTIVE | Noted: 2017-06-20

## 2021-06-16 PROBLEM — L30.9 CHRONIC ECZEMA: Status: ACTIVE | Noted: 2020-06-25

## 2021-06-16 PROBLEM — E11.9 CONTROLLED TYPE 2 DIABETES MELLITUS WITHOUT COMPLICATION, WITHOUT LONG-TERM CURRENT USE OF INSULIN (H): Status: ACTIVE | Noted: 2020-06-25

## 2021-06-16 PROBLEM — F51.01 INSOMNIA, IDIOPATHIC: Status: ACTIVE | Noted: 2020-06-25

## 2021-06-16 PROBLEM — F33.1 DEPRESSION, MAJOR, RECURRENT, MODERATE (H): Status: ACTIVE | Noted: 2017-04-17

## 2021-06-17 NOTE — PATIENT INSTRUCTIONS - HE
Cough:  Patient was educated on the natural course of viral illness. Declined COVID test. Chest x-ray was normal. Conservative measures discussed including increased fluids, humidifier, and OTC cough suppressants. Use your inhaler as needed. See your primary care provider if symptoms worsen or do not improve in 5 days. Seek emergency care if you develop fever over 104 or shortness of breath.     Muscle cramps:  Normal potassium. May discontinue potassium pills. Follow-up with your primary care provider for further concerns.

## 2021-06-17 NOTE — PROGRESS NOTES
URGENT CARE VISIT:    SUBJECTIVE:   Bárbara Perez is a 70 y.o. female presenting with a chief complaint of cough and chest congestion.  Onset was 1 day(s) ago. She denies the following symptoms: fever, sore throat, chest pain and dyspnea. Course of illness is stable. Treatment measures tried include albuterol inhaler and is on prednisone with some relief of symptoms. Predisposing factors include none. Former smoker. Quit 2 months ago. Hx of asthma, allergies, and COPD.     Also, has been having muscle cramps. Takes potassium occasionally. Has hx of low potassium. Would like lab checked. Denies any palpitations or shortness of breath.    PMH:   Past Medical History:   Diagnosis Date     Asthma      CAD (coronary artery disease)      Chest pain      COPD (chronic obstructive pulmonary disease) (H)      Diabetes mellitus (H)      GERD (gastroesophageal reflux disease)      Heart attack (H)      HTN (hypertension)      Hypothyroidism      Psoriasis      Seasonal allergies      Sternum pain     Following Cardiac Bypass     Allergies: Atorvastatin, Cefdinir, Dye, Ezetimibe, Latex, Penicillin v, Cetirizine, Chocolate laxative [sennosides], Codeine, Egg derived, Fenofibrate, Influenza virus vaccines, Iodine, Ioxaglate sodium, Latex, Peanut oil, and Toradol [ketorolac]   Medications:   Current Outpatient Medications   Medication Sig Dispense Refill     ADVAIR -21 mcg/actuation inhaler Inhale 2 puffs 2 (two) times a day.       albuterol (PROVENTIL) 5 mg/mL nebulizer solution Three times daily for 5 days then q 6hprn 20 mL 0     aspirin 81 mg chewable tablet Chew 81 mg daily.        clopidogreL (PLAVIX) 75 mg tablet Take 1 tablet (75 mg total) by mouth daily. Dose to start tomorrow 90 tablet 3     diphenhydrAMINE (BENADRYL) 25 mg capsule Take 25 mg by mouth 2 (two) times a day. With pain medications       fluticasone (FLONASE) 50 mcg/actuation nasal spray 2 sprays into each nostril at bedtime.        glipiZIDE  (GLUCOTROL) 5 MG tablet Take 2.5 mg by mouth 2 (two) times a day before meals.       ipratropium-albuterol (DUO-NEB) 0.5-2.5 mg/3 mL nebulizer Take 3 mL by nebulization 4 (four) times a day.        levothyroxine (SYNTHROID, LEVOTHROID) 50 MCG tablet Take 50 mcg by mouth 3 (three) times a day.        metoprolol tartrate (LOPRESSOR) 25 MG tablet Take 0.5 tablets (12.5 mg total) by mouth 2 (two) times a day. 60 tablet 0     naproxen (NAPROSYN) 500 MG tablet Take 1 tablet (500 mg total) by mouth 2 (two) times a day with meals. 30 tablet 0     PROAIR HFA 90 mcg/actuation inhaler Inhale 1-2 puffs every 6 (six) hours as needed for wheezing or shortness of breath.        simethicone (MYLICON) 80 MG chewable tablet Chew 160 mg 2 (two) times a day.       triamcinolone (KENALOG) 0.1 % cream Apply 1 application topically 3 (three) times a day. For psoriasis       acetaminophen (TYLENOL) 500 MG tablet Take 1,000 mg by mouth 2 (two) times a day.        blood glucose test (ACCU-CHEK ACTIVE TEST) strips Use 1 each As Directed as needed. Dispense brand per patient's insurance at pharmacy discretion. 100 each 11     blood glucose test (GLUCOSE BLOOD) strips Test once daily       compressor, for nebulizer Mellissa To use with albuterol as needed for wheezing and shortness of breath 1 Device 0     ezetimibe (ZETIA) 10 mg tablet Take 1 tablet (10 mg total) by mouth at bedtime. 30 tablet 0     generic lancets (ACCU-CHEK MULTICLIX LANCET) TESTING ONCE DAILY       loratadine (CLARITIN) 10 mg tablet Take 10 mg by mouth daily.       nitroglycerin (NITROSTAT) 0.4 MG SL tablet Place 1 tablet (0.4 mg total) under the tongue every 5 (five) minutes as needed for chest pain. 30 tablet 0     oxyCODONE-acetaminophen (PERCOCET/ENDOCET) 5-325 mg per tablet Take 1-2 tablets by mouth every 4 (four) hours as needed for pain. 10 tablet 0     No current facility-administered medications for this visit.      Social History:   Social History     Tobacco Use      Smoking status: Former Smoker     Packs/day: 1.00     Years: 30.00     Pack years: 30.00     Smokeless tobacco: Never Used     Tobacco comment: Smoking Cessation Packet provided   Substance Use Topics     Alcohol use: No        ROS:  Review of systems negative except as stated above.    OBJECTIVE:  /76 (Patient Site: Right Arm, Patient Position: Sitting, Cuff Size: Adult Large)   Pulse 83   Temp 98.3  F (36.8  C) (Oral)   Resp 18   SpO2 94%     GENERAL APPEARANCE: healthy, alert and no distress  EYES: conjunctiva clear  HENT: ear canals and TM's normal.  Non-erythematous oropharynx. Uvula midline.   NECK: supple, nontender, no lymphadenopathy  RESP: lungs clear to auscultation - no rales, rhonchi or wheezes  CV: regular rate and rhythm, normal S1 S2, no murmur noted  SKIN: no suspicious lesions or rashes  MSK: gait normal.   EXTREMITIES: no pedal edema.    Labs:    Results for orders placed or performed in visit on 05/18/21   Basic Metabolic Panel   Result Value Ref Range    Sodium 141 136 - 145 mmol/L    Potassium 3.8 3.5 - 5.0 mmol/L    Chloride 104 98 - 107 mmol/L    CO2 24 22 - 31 mmol/L    Anion Gap, Calculation 13 5 - 18 mmol/L    Glucose 168 (H) 70 - 125 mg/dL    Calcium 8.7 8.5 - 10.5 mg/dL    BUN 12 8 - 28 mg/dL    Creatinine 0.87 0.60 - 1.10 mg/dL    GFR MDRD Af Amer >60 >60 mL/min/1.73m2    GFR MDRD Non Af Amer >60 >60 mL/min/1.73m2      Imaging:  A Chest X-Ray was ordered. My reading of this film is negative for infilatrates. (No comparison films available: pending review by Radiologist.)    ASSESSMENT:  1. Cough  XR Chest 2 Views    XR Chest 2 Views   2. Muscle spasm  Basic Metabolic Panel       PLAN:  Patient Instructions   Cough:  Patient was educated on the natural course of viral illness. Declined COVID test. Chest x-ray was normal. Conservative measures discussed including increased fluids, humidifier, and OTC cough suppressants. Use your inhaler as needed. See your primary care provider  if symptoms worsen or do not improve in 5 days. Seek emergency care if you develop fever over 104 or shortness of breath.     Muscle cramps:  Normal potassium. May discontinue potassium pills. Follow-up with your primary care provider for further concerns.    Patient verbalized understanding and is agreeable to plan. The patient was discharged ambulatory and in stable condition.    Teresa Myers ....................  5/18/2021   5:15 PM

## 2021-06-18 NOTE — PATIENT INSTRUCTIONS - HE
Patient Instructions by Tom Scott PA-C at 7/7/2020  5:00 PM     Author: Tom Scott PA-C Service: -- Author Type: Physician Assistant    Filed: 7/7/2020  6:18 PM Encounter Date: 7/7/2020 Status: Signed    : Tom Scott PA-C (Physician Assistant)       Patient Education     COPD Flare    You have had a flare-up of your COPD.  COPD, or chronic obstructive pulmonary disease, is a common lung disease. It causes your airways to become irritated and narrower. This makes it harder for you to breathe. Emphysema and chronic bronchitis are both types of COPD. This is a chronic condition, which means you always have it. Sometimes it gets worse. When this happens, it is called a flare-up.  Symptoms of COPD  People with COPD may have symptoms most of the time. In a flare-up, your symptoms get worse. These symptoms may mean you are having a flare-up:    Shortness of breath, shallow or rapid breathing, or wheezing that gets worse    Lung infection    Cough that gets worse    More mucus, thicker mucus or mucus of a different color    Tiredness, decreased energy, or trouble doing your usual activities    Fever    Chest tightness    Your symptoms dont get better even when you use your usual medicines, inhalers, and nebulizer    Trouble talking    You feel confused  Causes of flare-ups  Unfortunately, a flare-up can happen even though you did everything right, and you followed your doctors instructions. Some causes of flare-ups are:    Smoking or secondhand smoke    Colds, the flu, or respiratory infections    Air pollution    Sudden change in the weather    Dust, irritating chemicals, or strong fumes    Not taking your medicines as prescribed  Home care  Here are some things you can do at home to treat a flare-up:    Try not to panic. This makes it harder to breathe, and keeps you from doing the right things.    Dont smoke or be around others who are smoking.    Try to drink more fluids than usual  during a flare-up, unless your doctor has told you not to because of heart and kidney problems. More fluids can help loosen the mucus.    Use your inhalers and nebulizer, if you have one, as you have been told to.    If you were given antibiotics, take them until they are used up or your doctor tells you to stop. Its important to finish the antibiotics, even though you feel better. This will make sure the infection has cleared.    If you were given prednisone or another steroid, finish it even if you feel better.  Preventing a flare-up  Even though flare-ups happen, the best way to treat one is to prevent it before it starts. Here are some pointers:    Dont smoke or be around others who are smoking.    Take your medicines as you have been told.    Talk with your doctor about getting a flu shot every year. Also find out if you need a pneumonia shot.    If there is a weather advisory warning to stay indoors, try to stay inside when possible.    Try to eat healthy and get plenty of sleep.    Try to avoid things that usually set you off, like dust, chemical fumes, hairsprays, or strong perfumes.  Follow-up care  Follow up with your healthcare provider, or as advised.  If a culture was done, you will be told if your treatment needs to be changed. You can call as directed for the results.  If X-rays were done, you will be notified of any new findings that may affect your care.  Call 911  Call 911 if any of these occur:    You have trouble breathing    You feel confused or its difficult to wake you up    You faint or lose consciousness    You have a rapid heart rate    You have new pain in your chest, arm, shoulder, neck or upper back  When to seek medical advice  Call your healthcare provider right away if any of these occur:    Wheezing or shortness of breath gets worse    You need to use your inhalers more often than usual without relief    Fever of 100.4 F (38 C) or higher, or as directed by your healthcare  provider    Coughing up lots of dark-colored or bloody mucus (sputum)    Chest pain with each breath    You do not start to get better within 24 hours    Swelling of your ankles gets worse    Dizziness or weakness  Date Last Reviewed: 9/1/2016 2000-2017 [employer]. [address] [place] [id number]. All rights reserved. This information is not intended as a substitute for professional medical care. Always follow your healthcare professional's instructions.           You were seen today for an exacerbation of wheezing and cough. You may use the prescribed albuterol treatments every 4-6 hours hours as needed. If prescribed, take dose of oral steroids as instructed.    Signs of another exacerbation include:  - Coughing, often at night or early morning, or when exercising  - Wheezing, or noisy breathing  - Tight feeling in the chest  - Trouble breathing    Common triggers of asthma attacks:  - Getting sick with a cold or flu  - Allergens (dust mites, mold, furry animals, pollen)  - Cigarette smoke  - Exercise  - Changes in weather, cold air, hot and humid air    If your shortness of breath is worsening and the symptoms do not improve or get worse after albuterol treatment, go to the emergency department or call [phone]    Recommend following up within 1 week with your primary care physician to go over long-term management goals.    Patient to follow up with Primary Care provider regarding elevated blood pressure.

## 2021-06-20 NOTE — LETTER
Letter by Lamar Boyce RN at      Author: Lamar Boyce RN Service: -- Author Type: --    Filed:  Encounter Date: 7/15/2020 Status: (Other)       7/15/2020        Bárbara Perez  [address]  [place] [address]    This letter provides a written record that you were tested for COVID-19 on 7/11/2020.     Your result was negative. This means that we didnt find the virus that causes COVID-19 in your sample. A test may show negative when you do actually have the virus. This can happen when the virus is in the early stages of infection, before you feel illness symptoms.    If you have symptoms   Stay home and away from others (self-isolate) until you meet ALL of the guidelines below:    Youve had no fever--and no medicine that reduces fever--for 3 full days (72 hours). And ?    Your other symptoms have gotten better. For example, your cough or breathing has improved. And?    At least 10 days have passed since your symptoms started.    During this time:    Stay home. Dont go to work, school or anywhere else.     Stay in your own room, including for meals. Use your own bathroom if you can.    Stay away from others in your home. No hugging, kissing or shaking hands. No visitors.    Clean high touch surfaces often (doorknobs, counters, handles, etc.). Use a household cleaning spray or wipes. You can find a full list on the EPA website at www.epa.gov/pesticide-registration/list-n-disinfectants-use-against-sars-cov-2.    Cover your mouth and nose with a mask, tissue or washcloth to avoid spreading germs.    Wash your hands and face often with soap and water.    Going back to work  Check with your employer for any guidelines to follow for going back to work.    Employers: This document serves as formal notice that your employee tested negative for COVID-19, as of the testing date shown above.

## 2021-06-23 ENCOUNTER — OFFICE VISIT - HEALTHEAST (OUTPATIENT)
Dept: FAMILY MEDICINE | Facility: CLINIC | Age: 71
End: 2021-06-23

## 2021-06-23 DIAGNOSIS — R35.0 URINARY FREQUENCY: ICD-10-CM

## 2021-06-23 DIAGNOSIS — E66.01 MORBID OBESITY (H): ICD-10-CM

## 2021-06-23 DIAGNOSIS — N39.0 URINARY TRACT INFECTION WITHOUT HEMATURIA, SITE UNSPECIFIED: ICD-10-CM

## 2021-06-23 LAB
ALBUMIN UR-MCNC: NEGATIVE G/DL
APPEARANCE UR: CLEAR
BACTERIA #/AREA URNS HPF: ABNORMAL /[HPF]
BILIRUB UR QL STRIP: NEGATIVE
COLOR UR AUTO: YELLOW
GLUCOSE UR STRIP-MCNC: NEGATIVE MG/DL
HGB UR QL STRIP: NEGATIVE
KETONES UR STRIP-MCNC: NEGATIVE MG/DL
LEUKOCYTE ESTERASE UR QL STRIP: ABNORMAL
NITRATE UR QL: NEGATIVE
PH UR STRIP: 5.5 [PH] (ref 5–8)
RBC #/AREA URNS AUTO: ABNORMAL HPF
SP GR UR STRIP: <=1.005 (ref 1–1.03)
SQUAMOUS #/AREA URNS AUTO: ABNORMAL LPF
UROBILINOGEN UR STRIP-ACNC: ABNORMAL
WBC #/AREA URNS AUTO: ABNORMAL HPF

## 2021-06-24 LAB — BACTERIA SPEC CULT: NORMAL

## 2021-06-25 NOTE — PROGRESS NOTES
Progress Notes by Sydni Sol MD at 4/25/2017  3:10 PM     Author: Sydni Sol MD Service: -- Author Type: Physician    Filed: 4/25/2017  4:14 PM Encounter Date: 4/25/2017 Status: Signed    : Sydni Sol MD (Physician)           Click to link to [facility] HEART CARE NOTE    Thank you, Dr. Ricardo, for asking the [facility] team to see Ms. Bárbara Perez to follow-up on coronary artery disease.    Assessment/Recommendations   Assessment:    1.  Coronary artery disease, status post recent PTCA of the saphenous vein graft to the obtuse marginal branch with good results.  Patient refused placement of a stent due to concerns of an allergy.  She was advised to stay on clopidogrel for a minimum of 2 weeks and stopped at that point given concerns of dye in the clopidogrel tablets.  She is now on higher dose aspirin therapy and appears to be doing well with no recurrent anginal symptoms during cardiac rehab sessions.  At this point, would continue current medical management.  2.  Hyperlipidemia, untreated.  She agreed to a re-trial of low-dose atorvastatin in addition to Zetia.  It appears she has stopped both of these medications and is currently on no medications to treat her hyperlipidemia.  She tells me she is following a low saturated fat diet and a diet designed to reduce inflammation.  We will see if this helps to lessen her recurrence of cardiac events.  3.  Ongoing tobacco use.  She continues to smoke an occasional cigarette.  We talked about the importance of trying to quit entirely.      Plan:  1.  Continue current medications  2.  Follow-up in 6 months or sooner if recurrent chest discomfort     History of Present Illness    Ms. Bárbara Perez is a 66 y.o. female with history of type 2 diabetes mellitus, hyperlipidemia, tobacco use and coronary artery disease status post previous coronary artery bypass grafting who presented in late January to [facility]  with recurrent chest pain and evidence of a non-ST elevation MI.  She underwent coronary angiography which disclosed a high-grade stenosis in the vein graft to the obtuse marginal branch.  Because of concerns of an allergic reaction to metal in the past, she refused consideration of stent placement but was agreeable to traditional angioplasty.  This was subsequently performed with excellent results.  She was advised to be on clopidogrel for minimum of 2 weeks but ultimately for 1 year if she could tolerate the medication.  Unfortunately, because of her history of allergies to colored pills, she only took the clopidogrel for the 2 weeks and stopped.  She has since been on the higher dose of aspirin which was advised.    Currently, Bárbara reports no complaints of exertional chest discomfort.  She continues to participate in outpatient cardiac rehab and tells me she is doing really well.  Unfortunately, she is no longer on any cholesterol-lowering medications again reporting allergies to these medications in the past.  She is trying to follow a low saturated fat diet which is also a low inflammatory diet.  She tells me she has more energy.    ECG (personally reviewed): No ECG today     ECHO (personally reviewed): No recent echo      Physical Examination Review of Systems   Vitals:    04/25/17 1447   BP: 140/80   Pulse: 60   Resp: 18     Body mass index is 38.09 kg/(m^2).  Wt Readings from Last 3 Encounters:   04/25/17 215 lb (97.5 kg)   04/25/17 216 lb 11.2 oz (98.3 kg)   04/18/17 217 lb (98.4 kg)     General Appearance:   Awake, Alert, No acute distress.   HEENT:  No scleral icterus; the mucous membranes were pink and moist.   Neck: No cervical bruits or jugular venous distention    Chest: The spine was straight. The chest was symmetric.   Lungs:   Respirations unlabored; the lungs are clear to auscultation. No wheezing   Cardiovascular:    regular rate and rhythm.  S1, S2 normal.  No murmur, gallop or rub    Abdomen:   No organomegaly, masses, bruits, or tenderness. Bowels sounds are present   Extremities:  no peripheral edema bilaterally    Skin: No xanthelasma. Warm, Dry.   Musculoskeletal: No tenderness.   Neurologic: Mood and affect are appropriate.    General: WNL  Eyes: WNL  Ears/Nose/Throat: WNL  Lungs: WNL  Heart: WNL  Stomach: WNL  Bladder: WNL  Muscle/Joints: Muscle Weakness, Joint Pain, Muscle Pain  Skin: WNL  Nervous System: WNL  Mental Health: Depression     Blood: WNL     Medical History  Surgical History Family History Social History   Past Medical History:   Diagnosis Date   ? Asthma    ? CAD (coronary artery disease)    ? Chest pain    ? COPD (chronic obstructive pulmonary disease)    ? Diabetes mellitus    ? GERD (gastroesophageal reflux disease)    ? Heart attack    ? HTN (hypertension)    ? Hypothyroidism    ? Psoriasis    ? Seasonal allergies    ? Sternum pain     Following Cardiac Bypass    Past Surgical History:   Procedure Laterality Date   ? APPENDECTOMY     ? CARDIAC CATHETERIZATION  01/26/2017   ? CARDIAC CATHETERIZATION N/A 1/26/2017    Procedure: Coronary Angiogram;  Surgeon: Arsen Anderson MD;  Location: [facility];  Service:    ? CARDIAC SURGERY  2008    Triple Bypass   ? HYSTERECTOMY      age 30's   ? WRIST SURGERY Left     Ligament Surgery    Family History   Problem Relation Age of Onset   ? Arthritis Mother    ? Kidney disease Father    ? Heart disease Sister    ? Colon cancer Brother    ? Cancer Maternal Aunt     Social History     Social History   ? Marital status:      Spouse name: N/A   ? Number of children: N/A   ? Years of education: N/A     Occupational History   ? Not on file.     Social History Main Topics   ? Smoking status: Former Smoker     Packs/day: 1.00     Years: 30.00   ? Smokeless tobacco: Not on file      Comment: Smoking Cessation Packet provided   ? Alcohol use No   ? Drug use: No   ? Sexual activity: No     Other Topics Concern   ? Not on file      Social History Narrative    Patient presented to the ED with her  with a complaint of pain and cough 01/22/17              Medications  Allergies   Current Outpatient Prescriptions   Medication Sig Dispense Refill   ? acetaminophen (TYLENOL) 325 MG tablet Take 650 mg by mouth 2 (two) times a day.     ? albuterol (PROVENTIL) 5 mg/mL nebulizer solution Take 0.5 mL (2.5 mg total) by nebulization every 6 (six) hours as needed for wheezing. 20 mL 0   ? aspirin 325 MG tablet Take 325 mg by mouth daily.     ? blood glucose test (ACCU-CHEK ACTIVE TEST) strips Use 1 each As Directed as needed. Dispense brand per patient's insurance at pharmacy discretion. 100 each 11   ? blood glucose test (GLUCOSE BLOOD) strips Test once daily     ? compressor, for nebulizer Mellissa To use with albuterol as needed for wheezing and shortness of breath 1 Device 0   ? diphenhydrAMINE (BENADRYL) 25 mg capsule Take 25 mg by mouth bedtime as needed for sleep.     ? famotidine (PEPCID) 20 MG tablet Take 1 tablet (20 mg total) by mouth 2 (two) times a day. 45 tablet 0   ? fluticasone (FLONASE) 50 mcg/actuation nasal spray 2 sprays.     ? generic lancets (ACCU-CHEK MULTICLIX LANCET) TESTING ONCE DAILY     ? glipiZIDE (GLUCOTROL) 5 MG tablet Take 0.5 tablets (2.5 mg total) by mouth daily.  0   ? levothyroxine (SYNTHROID, LEVOTHROID) 50 MCG tablet Take 150 mcg by mouth Daily at 6:00 am.      ? loratadine (CLARITIN) 10 mg tablet Take 10 mg by mouth daily as needed.      ? metoprolol tartrate (LOPRESSOR) 25 MG tablet Take 0.5 tablets (12.5 mg total) by mouth every evening. 45 tablet 0   ? nitroglycerin (NITROSTAT) 0.4 MG SL tablet Place 1 tablet (0.4 mg total) under the tongue every 5 (five) minutes as needed for chest pain. 30 tablet 0   ? potassium gluconate 550 mg (90 mg) tablet Take by mouth.     ? PROAIR HFA 90 mcg/actuation inhaler Inhale 1-2 puffs every 6 (six) hours as needed for wheezing or shortness of breath.      ? triamcinolone  (KENALOG) 0.1 % cream Apply 1 application topically 2 (two) times a day as needed. Dermatitis on hand     ? atorvastatin (LIPITOR) 10 MG tablet Take 1 tablet (10 mg total) by mouth bedtime. 90 tablet 0   ? ezetimibe (ZETIA) 10 mg tablet Take 1 tablet (10 mg total) by mouth bedtime. 30 tablet 0   ? metoprolol tartrate (LOPRESSOR) 25 MG tablet Take 1 tablet (25 mg total) by mouth daily with breakfast. 90 tablet 0   ? sulfamethoxazole-trimethoprim (SEPTRA DS) 800-160 mg per tablet TK 1 T PO BID FOR 7 DAYS  0     No current facility-administered medications for this visit.       Allergies   Allergen Reactions   ? Latex Anaphylaxis   ? Other Drug Allergy (See Comments)      Allergy to all dyes.  Cannot take any medications with dyes or color. Causes rapid heartrate, sweating.  Patient unsure of other symptoms but states they are severe.   ? Penicillin V Shortness Of Breath and Rash   ? Cetirizine      unknown   ? Chocolate Laxative [Sennosides]    ? Codeine Unknown   ? Egg Derived    ? Fenofibrate    ? Influenza Virus Vaccines    ? Iodine    ? Ioxaglate Sodium Itching   ? Latex    ? Peanut Oil    ? Toradol [Ketorolac]      Ulcers, takes aspirin daily at home and Aleve as needed         Lab Results    Chemistry/lipid CBC Cardiac Enzymes/BNP/TSH/INR   Lab Results   Component Value Date    CHOL 205 (H) 01/25/2017    HDL 25 (L) 01/25/2017    LDLCALC 113 01/25/2017    TRIG 334 (H) 01/25/2017    CREATININE 0.84 01/28/2017    BUN 9 01/28/2017    K 4.0 01/28/2017     01/28/2017     01/28/2017    CO2 28 01/28/2017    Lab Results   Component Value Date    WBC 5.5 01/25/2017    HGB 15.4 01/27/2017    HCT 46.6 01/25/2017    MCV 93 01/25/2017     01/27/2017    Lab Results   Component Value Date    CKTOTAL 83 01/22/2017    CKMB 60 (HH) 01/27/2017    TROPONINI 0.40 (HH) 01/26/2017    BNP 66 01/22/2017    TSH 7.6 (H) 04/11/2010    INR 0.97 01/25/2017

## 2021-06-29 NOTE — PROGRESS NOTES
Progress Notes by Tom Scott PA-C at 7/7/2020  5:00 PM     Author: Tom Scott PA-C Service: -- Author Type: Physician Assistant    Filed: 7/7/2020  6:29 PM Encounter Date: 7/7/2020 Status: Signed    : Tom Scott PA-C (Physician Assistant)         Assessment & Plan:       1. Intermittent asthma with acute exacerbation, unspecified asthma severity  XR Chest 2 Views    Symptomatic COVID-19 Virus (CORONAVIRUS) PCR    fluticasone (VERAMYST) 27.5 mcg/actuation nasal spray   2. Chronic obstructive pulmonary disease, unspecified COPD type (H)     3. Benign essential hypertension        Medical Decision Making  Patient presents per request with neurologist to have pneumonia ruled out due to complaints of chest pains and recent treatment of asthma symptoms.  Patient does not appear in respiratory distress during exam, clear lung auscultation, has decent oxygen saturation 95% on room air for COPD patient, and a negative chest x-ray for focal infiltrate.  Recommend patient continue to take oral steroids and use a butyryl nebulizers as needed.  We will add a short course of nasal steroids help with patient's complaint of congestion and drainage.  She will continue with the over-the-counter 24-hour antihistamines.  Patient was also noted to be hypertensive at this time.  This is most likely due to her recent use of oral decongestants.  Recommended patient discontinue oral decongestants.  She will follow-up in 1 to 2 weeks with her primary care provider to recheck her blood pressure at that time.  Also have patient receive a curbside COVID-19 test.  Discussed prevention of spreading illness and self-isolation.  Discussed signs of worsening symptoms and when to follow-up with PCP if no symptom improvement.    Patient Instructions   Patient Education     COPD Flare    You have had a flare-up of your COPD.  COPD, or chronic obstructive pulmonary disease, is a common lung disease. It causes your  airways to become irritated and narrower. This makes it harder for you to breathe. Emphysema and chronic bronchitis are both types of COPD. This is a chronic condition, which means you always have it. Sometimes it gets worse. When this happens, it is called a flare-up.  Symptoms of COPD  People with COPD may have symptoms most of the time. In a flare-up, your symptoms get worse. These symptoms may mean you are having a flare-up:    Shortness of breath, shallow or rapid breathing, or wheezing that gets worse    Lung infection    Cough that gets worse    More mucus, thicker mucus or mucus of a different color    Tiredness, decreased energy, or trouble doing your usual activities    Fever    Chest tightness    Your symptoms dont get better even when you use your usual medicines, inhalers, and nebulizer    Trouble talking    You feel confused  Causes of flare-ups  Unfortunately, a flare-up can happen even though you did everything right, and you followed your doctors instructions. Some causes of flare-ups are:    Smoking or secondhand smoke    Colds, the flu, or respiratory infections    Air pollution    Sudden change in the weather    Dust, irritating chemicals, or strong fumes    Not taking your medicines as prescribed  Home care  Here are some things you can do at home to treat a flare-up:    Try not to panic. This makes it harder to breathe, and keeps you from doing the right things.    Dont smoke or be around others who are smoking.    Try to drink more fluids than usual during a flare-up, unless your doctor has told you not to because of heart and kidney problems. More fluids can help loosen the mucus.    Use your inhalers and nebulizer, if you have one, as you have been told to.    If you were given antibiotics, take them until they are used up or your doctor tells you to stop. Its important to finish the antibiotics, even though you feel better. This will make sure the infection has cleared.    If you were given  prednisone or another steroid, finish it even if you feel better.  Preventing a flare-up  Even though flare-ups happen, the best way to treat one is to prevent it before it starts. Here are some pointers:    Dont smoke or be around others who are smoking.    Take your medicines as you have been told.    Talk with your doctor about getting a flu shot every year. Also find out if you need a pneumonia shot.    If there is a weather advisory warning to stay indoors, try to stay inside when possible.    Try to eat healthy and get plenty of sleep.    Try to avoid things that usually set you off, like dust, chemical fumes, hairsprays, or strong perfumes.  Follow-up care  Follow up with your healthcare provider, or as advised.  If a culture was done, you will be told if your treatment needs to be changed. You can call as directed for the results.  If X-rays were done, you will be notified of any new findings that may affect your care.  Call 911  Call 911 if any of these occur:    You have trouble breathing    You feel confused or its difficult to wake you up    You faint or lose consciousness    You have a rapid heart rate    You have new pain in your chest, arm, shoulder, neck or upper back  When to seek medical advice  Call your healthcare provider right away if any of these occur:    Wheezing or shortness of breath gets worse    You need to use your inhalers more often than usual without relief    Fever of 100.4 F (38 C) or higher, or as directed by your healthcare provider    Coughing up lots of dark-colored or bloody mucus (sputum)    Chest pain with each breath    You do not start to get better within 24 hours    Swelling of your ankles gets worse    Dizziness or weakness  Date Last Reviewed: 9/1/2016 2000-2017 [employer]. [address] [place] [address], [place] [address]. All rights reserved. This information is not intended as a substitute for professional medical care. Always follow your healthcare  professional's instructions.           You were seen today for an exacerbation of wheezing and cough. You may use the prescribed albuterol treatments every 4-6 hours hours as needed. If prescribed, take dose of oral steroids as instructed.    Signs of another exacerbation include:  - Coughing, often at night or early morning, or when exercising  - Wheezing, or noisy breathing  - Tight feeling in the chest  - Trouble breathing    Common triggers of asthma attacks:  - Getting sick with a cold or flu  - Allergens (dust mites, mold, furry animals, pollen)  - Cigarette smoke  - Exercise  - Changes in weather, cold air, hot and humid air    If your shortness of breath is worsening and the symptoms do not improve or get worse after albuterol treatment, go to the emergency department or call [phone]    Recommend following up within 1 week with your primary care physician to go over long-term management goals.    Patient to follow up with Primary Care provider regarding elevated blood pressure.          Subjective:       Bárbara Perez is a 69 y.o. female here for evaluation of possible pneumonia.  Patient has extensive medical history including seasonal allergies, asthma, COPD, coronary artery disease status post CABG, hypertension, and tobacco use.  Patient was sent over to the walk-in care clinic to be evaluated by her allergist.  Allergist has already given patient oral prednisone and albuterol nebulizers which have been helping her symptoms.  However, they want to rule out bacterial pneumonia in the lungs.  Patient has no history of fevers, but she does note some occasional chills.  Associated symptoms also include mild sinus congestion and ear fullness.  No sore throat, no shortness of breath, no cough, no ear pains, no stomachaches, and no diarrhea.    The following portions of the patient's history were reviewed and updated as appropriate: allergies, current medications and problem list.    Review of Systems  A 12  point comprehensive review of systems was negative except as noted.     Allergies  Allergies   Allergen Reactions   ? Atorvastatin Shortness Of Breath and Swelling     Was previously deleted but on current encounter (6/20/17), patient told me to add as an allergy.    ? Dye Other (See Comments)     Allergy to all dyes.  Cannot take any medications with dyes or color. Causes rapid heartrate, sweating.  Patient unsure of other symptoms but states they are severe.   ? Ezetimibe Shortness Of Breath and Swelling   ? Latex Anaphylaxis   ? Penicillin V Shortness Of Breath and Rash   ? Cetirizine      unknown   ? Chocolate Laxative [Sennosides]    ? Codeine Unknown   ? Egg Derived    ? Fenofibrate    ? Influenza Virus Vaccines    ? Iodine    ? Ioxaglate Sodium Itching   ? Latex    ? Peanut Oil    ? Toradol [Ketorolac]      Ulcers, takes aspirin daily at home and Aleve as needed       Family History   Problem Relation Age of Onset   ? Arthritis Mother    ? Kidney disease Father    ? Heart disease Sister    ? Colon cancer Brother    ? Cancer Maternal Aunt        Social History     Socioeconomic History   ? Marital status:      Spouse name: None   ? Number of children: None   ? Years of education: None   ? Highest education level: None   Occupational History   ? None   Social Needs   ? Financial resource strain: None   ? Food insecurity     Worry: None     Inability: None   ? Transportation needs     Medical: None     Non-medical: None   Tobacco Use   ? Smoking status: Former Smoker     Packs/day: 1.00     Years: 30.00     Pack years: 30.00   ? Smokeless tobacco: Never Used   ? Tobacco comment: Smoking Cessation Packet provided   Substance and Sexual Activity   ? Alcohol use: No   ? Drug use: No   ? Sexual activity: Never   Lifestyle   ? Physical activity     Days per week: None     Minutes per session: None   ? Stress: None   Relationships   ? Social connections     Talks on phone: None     Gets together: None      Attends Orthodox service: None     Active member of club or organization: None     Attends meetings of clubs or organizations: None     Relationship status: None   ? Intimate partner violence     Fear of current or ex partner: None     Emotionally abused: None     Physically abused: None     Forced sexual activity: None   Other Topics Concern   ? None   Social History Narrative    Patient presented to the ED with her  with a complaint of pain and cough 01/22/17         Objective:       BP (!) 191/94 (Patient Site: Right Arm)   Pulse 92   Temp 98.9  F (37.2  C)   Resp 18   SpO2 95%   General appearance: alert, appears stated age, cooperative, no distress and non-toxic  Head: Normocephalic, without obvious abnormality, atraumatic  Ears: normal TM's and external ear canals both ears  Nose: no discharge  Throat: lips, mucosa, and tongue normal; teeth and gums normal  Neck: no adenopathy and supple, symmetrical, trachea midline  Lungs: clear to auscultation bilaterally  Heart: regular rate and rhythm, S1, S2 normal, no murmur, click, rub or gallop    Imaging    Xr Chest 2 Views    Result Date: 7/7/2020  EXAM DATE:         07/07/2020 EXAM: X-RAY CHEST, 2 VIEWS, FRONTAL AND LATERAL LOCATION: [facility] DATE/TIME: 7/7/2020 6:00 PM INDICATION: worsening cough and shortness of breath; rule out pneumonia COMPARISON: 05/02/2019 IMPRESSION: Prior sternotomy and apparent CABG procedure. Upper normal heart size. No evidence for CHF or pneumonia. No pleural effusion or pneumothorax. Mild triangular opacity on lateral view felt to represent superimposition of normal structures rather than true pulmonary opacity.     Discussed findings with the patient.

## 2021-06-29 NOTE — PROGRESS NOTES
Progress Notes by Sydni Sol MD at 10/8/2020  3:30 PM     Author: Sydni Sol MD Service: -- Author Type: Physician    Filed: 10/8/2020  5:18 PM Encounter Date: 10/8/2020 Status: Signed    : Sydni Sol MD (Physician)           Thank you, Dr. Schultz, for asking the [facility] team to see Ms. Bárbara Perez to follow-up on coronary artery disease and hyperlipidemia.    Assessment/Recommendations   Assessment:    1.  Coronary artery disease, status post balloon angioplasty of saphenous vein graft to second obtuse marginal branch.  A stent was not placed due to concerns of allergic response.  She is doing well with no complaints of exertional chest discomfort or dyspnea at today's visit.  We had a long discussion regarding her recent hospitalization and frustrations with medical staff.  We also talked about the severe stenosis in her right coronary artery which was not amenable to PTCA given heavy calcification.  I told her it would require atherectomy and stent placement which we cannot do.  For now, would continue with medical therapy and monitor for any anginal symptoms.  2.  Hypercholesterolemia, intolerant of statins.  She is currently on Zetia 10 mg daily although this will be insufficient to lower her LDL below 70 which is the goal given her diabetic status and coronary disease.  In the hospital, I mentioned Repatha or Praluent as options if she is agreeable to try.  She would like to research these medications first and will get back to me.  3.  Essential hypertension, well controlled    Plan:  1.  Continue current medication  2.  Patient to research Praluent or Repatha as a means to lower her lipids.  She will let me know if she would like to have me send in a prescription for preauthorization  3.  Follow-up in 1 year or sooner if new anginal symptoms       History of Present Illness    Ms. Bárbara Perez is a 70 y.o. female with known coronary artery disease, status post  previous coronary artery bypass grafting with recent hospitalization for acute coronary syndrome now status post PTCA of a vein graft to obtuse marginal branch, mixed hyperlipidemia intolerant of statins, type 2 diabetes mellitus and essential hypertension who presents today for a post hospital visit.  She has done well over the course the last 4 to 6 weeks without any anginal symptoms.  She denies orthopnea, PND or lower extremity edema.  She is back on Saturday as a means to try to lower her lipids although again I told her this will not be sufficient to get her LDL to goal which is less than 70.  Her daughter accompanies her to the visit today and we had long discussion regarding her coronary artery disease and cholesterol issues.    ECG (personally reviewed): No ECG today    Cardiac Imaging Studies (personally reviewed): No new imaging     Physical Examination Review of Systems   Vitals:    10/08/20 1546   BP: 122/50   Pulse: 64   Resp: 20     Body mass index is 37.39 kg/m .  Wt Readings from Last 3 Encounters:   10/08/20 204 lb 6.4 oz (92.7 kg)   09/08/20 203 lb 1.6 oz (92.1 kg)   08/28/20 220 lb (99.8 kg)     General Appearance:   Awake, Alert, No acute distress.   HEENT:  No scleral icterus; the mucous membranes were pink and moist.   Neck: No cervical bruits or jugular venous distention    Chest: The spine was straight. The chest was symmetric.   Lungs:   Respirations unlabored; the lungs are clear to auscultation. No wheezing   Cardiovascular:    Regular rate and rhythm.  S1, S2 normal.  No murmur or gallop   Abdomen:  No organomegaly, masses, bruits, or tenderness. Bowels sounds are present   Extremities:  No peripheral edema bilaterally.   Skin: No xanthelasma. Warm, Dry.   Musculoskeletal: No tenderness.   Neurologic: Mood and affect are appropriate.                                              Medical History  Surgical History Family History Social History   Past Medical History:   Diagnosis Date   ?  Asthma    ? CAD (coronary artery disease)    ? Chest pain    ? COPD (chronic obstructive pulmonary disease) (H)    ? Diabetes mellitus (H)    ? GERD (gastroesophageal reflux disease)    ? Heart attack (H)    ? HTN (hypertension)    ? Hypothyroidism    ? Psoriasis    ? Seasonal allergies    ? Sternum pain     Following Cardiac Bypass    Past Surgical History:   Procedure Laterality Date   ? APPENDECTOMY     ? CARDIAC CATHETERIZATION  01/26/2017   ? CARDIAC CATHETERIZATION N/A 1/26/2017    Procedure: Coronary Angiogram;  Surgeon: Arsen Anderson MD;  Location: [facility];  Service:    ? CARDIAC SURGERY  2008    Triple Bypass   ? CV CORONARY ANGIOGRAM N/A 9/7/2020    Procedure: Coronary Angiogram;  Surgeon: Adriana Schultz MD;  Location: [facility];  Service: Cardiology   ? CV LEFT HEART CATHETERIZATION WO LEFT VETRICULOGRAM Left 9/7/2020    Procedure: Left Heart Catheterization Without Left Ventriculogram;  Surgeon: Adriana Schultz MD;  Location: [facility];  Service: Cardiology   ? HYSTERECTOMY      age 30's   ? WRIST SURGERY Left     Ligament Surgery    Family History   Problem Relation Age of Onset   ? Arthritis Mother    ? Kidney disease Father    ? Heart disease Sister    ? Colon cancer Brother    ? Cancer Maternal Aunt     Social History     Socioeconomic History   ? Marital status:      Spouse name: Not on file   ? Number of children: Not on file   ? Years of education: Not on file   ? Highest education level: Not on file   Occupational History   ? Not on file   Social Needs   ? Financial resource strain: Not on file   ? Food insecurity     Worry: Not on file     Inability: Not on file   ? Transportation needs     Medical: Not on file     Non-medical: Not on file   Tobacco Use   ? Smoking status: Former Smoker     Packs/day: 1.00     Years: 30.00     Pack years: 30.00   ? Smokeless tobacco: Never Used   ? Tobacco comment: Smoking Cessation Packet provided    Substance and Sexual Activity   ? Alcohol use: No   ? Drug use: No   ? Sexual activity: Never   Lifestyle   ? Physical activity     Days per week: Not on file     Minutes per session: Not on file   ? Stress: Not on file   Relationships   ? Social connections     Talks on phone: Not on file     Gets together: Not on file     Attends Oriental orthodox service: Not on file     Active member of club or organization: Not on file     Attends meetings of clubs or organizations: Not on file     Relationship status: Not on file   ? Intimate partner violence     Fear of current or ex partner: Not on file     Emotionally abused: Not on file     Physically abused: Not on file     Forced sexual activity: Not on file   Other Topics Concern   ? Not on file   Social History Narrative    Patient presented to the ED with her  with a complaint of pain and cough 01/22/17          Medications  Allergies   Current Outpatient Medications   Medication Sig Dispense Refill   ? acetaminophen (TYLENOL) 500 MG tablet Take 1,000 mg by mouth 2 (two) times a day.      ? ADVAIR -21 mcg/actuation inhaler Inhale 2 puffs 2 (two) times a day.     ? albuterol (PROVENTIL) 5 mg/mL nebulizer solution Three times daily for 5 days then q 6hprn 20 mL 0   ? aspirin 81 mg chewable tablet Chew 81 mg daily.      ? blood glucose test (ACCU-CHEK ACTIVE TEST) strips Use 1 each As Directed as needed. Dispense brand per patient's insurance at pharmacy discretion. 100 each 11   ? blood glucose test (GLUCOSE BLOOD) strips Test once daily     ? clopidogreL (PLAVIX) 75 mg tablet Take 1 tablet (75 mg total) by mouth daily. Dose to start tomorrow 90 tablet 3   ? compressor, for nebulizer Mellissa To use with albuterol as needed for wheezing and shortness of breath 1 Device 0   ? diphenhydrAMINE (BENADRYL) 25 mg capsule Take 25 mg by mouth 2 (two) times a day. With pain medications     ? ezetimibe (ZETIA) 10 mg tablet Take 1 tablet (10 mg total) by mouth at bedtime. 30  tablet 0   ? fluticasone (FLONASE) 50 mcg/actuation nasal spray 2 sprays into each nostril at bedtime.      ? generic lancets (ACCU-CHEK MULTICLIX LANCET) TESTING ONCE DAILY     ? glipiZIDE (GLUCOTROL) 5 MG tablet Take 2.5 mg by mouth 2 (two) times a day before meals.     ? ipratropium-albuterol (DUO-NEB) 0.5-2.5 mg/3 mL nebulizer Take 3 mL by nebulization 4 (four) times a day.      ? levothyroxine (SYNTHROID, LEVOTHROID) 50 MCG tablet Take 50 mcg by mouth 3 (three) times a day.      ? loratadine (CLARITIN) 10 mg tablet Take 10 mg by mouth daily.     ? metoprolol tartrate (LOPRESSOR) 25 MG tablet Take 0.5 tablets (12.5 mg total) by mouth 2 (two) times a day. 60 tablet 0   ? nitroglycerin (NITROSTAT) 0.4 MG SL tablet Place 1 tablet (0.4 mg total) under the tongue every 5 (five) minutes as needed for chest pain. 30 tablet 0   ? PROAIR HFA 90 mcg/actuation inhaler Inhale 1-2 puffs every 6 (six) hours as needed for wheezing or shortness of breath.      ? simethicone (MYLICON) 80 MG chewable tablet Chew 160 mg 2 (two) times a day.     ? triamcinolone (KENALOG) 0.1 % cream Apply 1 application topically 3 (three) times a day. For psoriasis       No current facility-administered medications for this visit.       Allergies   Allergen Reactions   ? Atorvastatin Shortness Of Breath and Swelling     Was previously deleted but on current encounter (6/20/17), patient told me to add as an allergy.    ? Cefdinir Shortness Of Breath and Itching   ? Dye Other (See Comments)     Allergy to all dyes.  Cannot take any medications with dyes or color. Causes rapid heartrate, sweating.  Patient unsure of other symptoms but states they are severe.   ? Ezetimibe Shortness Of Breath and Swelling     Spoke with patient 9/8/20, stated reaction was shoulder pain, and she never experienced shortness of breath from zetia.    ? Latex Anaphylaxis   ? Penicillin V Shortness Of Breath and Rash   ? Cetirizine      unknown   ? Chocolate Laxative  [Sennosides]    ? Codeine Unknown   ? Egg Derived    ? Fenofibrate    ? Influenza Virus Vaccines    ? Iodine    ? Ioxaglate Sodium Itching   ? Latex    ? Peanut Oil    ? Toradol [Ketorolac]      Ulcers, takes aspirin daily at home and Aleve as needed         Lab Results    Chemistry/lipid CBC Cardiac Enzymes/BNP/TSH/INR   Lab Results   Component Value Date    CHOL 215 (H) 09/07/2020    HDL 33 (L) 09/07/2020    LDLCALC 135 (H) 09/07/2020    TRIG 237 (H) 09/07/2020    CREATININE 0.89 09/08/2020    BUN 14 09/08/2020    K 4.6 09/08/2020     09/08/2020     (H) 09/08/2020    CO2 19 (L) 09/08/2020    Lab Results   Component Value Date    WBC 7.4 09/06/2020    HGB 15.1 09/06/2020    HCT 44.4 09/06/2020    MCV 90 09/06/2020     09/06/2020    Lab Results   Component Value Date    CKTOTAL 83 01/22/2017    CKMB 60 (HH) 01/27/2017    TROPONINI 0.92 (HH) 09/07/2020    BNP 47 02/14/2019    TSH 7.6 (H) 04/11/2010    INR 1.00 09/06/2020

## 2021-07-04 NOTE — PATIENT INSTRUCTIONS - HE
Patient Instructions by Cesar Stephens PA-C at 6/23/2021  6:15 PM     Author: Cesar Stephens PA-C Service: -- Author Type: Physician Assistant    Filed: 6/23/2021  7:04 PM Encounter Date: 6/23/2021 Status: Addendum    : Cesar Stephens PA-C (Physician Assistant)    Related Notes: Original Note by Cesar Stephens PA-C (Physician Assistant) filed at 6/23/2021  7:02 PM       Increased fluids and rest.  Discussed signs and symptoms of ascending urinary tract infection symptoms to include pyelonephritis. Instructed to turn to clinic if there are increased fever chills night sweats fatigue abdominal pain or flank pain  Antibiotic as written. Risks and benefits of medication discussed.  Indication for return to clinic.      As a result of our visit today, here are the action plans for you:    1. Medication(s) to stop: There are no discontinued medications.    2. Medication(s) to start or change:   Medications Ordered   Medications   ? ciprofloxacin HCl (CIPRO) 500 MG tablet     Sig: Take 1 tablet (500 mg total) by mouth 2 (two) times a day for 10 days.     Dispense:  20 tablet     Refill:  0       3. Other instructions: Yes      Urinary Tract Infections in Women    Urinary tract infections (UTIs) are most often caused by bacteria (germs). These bacteria enter the urinary tract. The bacteria may come from outside the body. Or they may travel from the skin outside the rectum or vagina into the urethra. Female anatomy makes it easier for bacteria from the bowel to enter a womans urinary tract, which is the most common source of UTI. This means women develop UTIs more often than men. Pain in or around the urinary tract is a common UTI symptom. But the only way to know for sure if you have a UTI for the health care provider to test your urine. The two tests that may be done are the urinalysis and urine culture.  Types of UTIs    Cystitis: A bladder infection (cystitis) is the most common UTI in women. You may have urgent or  frequent urination. You may also have pain, burning when you urinate, and bloody urine.    Urethritis: This is an inflamed urethra, which is the tube that carries urine from the bladder to outside the body. You may have lower stomach or back pain. You may also have urgent or frequent urination.    Pyelonephritis: This is a kidney infection. If not treated, it can be serious and damage your kidneys. In severe cases, you may be hospitalized. You may have a fever and lower back pain.  Medications to treat a UTI  Most UTIs are treated with antibiotics. These kill the bacteria. The length of time you need to take them depends on the type of infection. It may be as short as 3 days. If you have repeated UTIs, a low-dose antibiotic may be needed for several months. Take antibiotics exactly as directed. Dont stop taking them until all of the medication is gone. If you stop taking the antibiotic too soon, the infection may not go away, and you may develop a resistance to the antibiotic. This can make it much harder to treat.  Lifestyle changes to treat and prevent UTIs  The lifestyle changes below will help get rid of your UTI. They may also help prevent future UTIs.    Drink plenty of fluids. This includes water, juice, or other caffeine-free drinks. Fluids help flush bacteria out of your body.    Empty your bladder. Always empty your bladder when you feel the urge to urinate. And always urinate before going to sleep. Urine that stays in your bladder can lead to infection. Try to urinate before and after sex as well.    Practice good personal hygiene. Wipe yourself from front to back after using the toilet. This helps keep bacteria from getting into the urethra.    Use condoms during sex. These help prevent UTIs caused by sexually transmitted bacteria. Also, avoid using spermicides during sex. These can increase the risk of UTIs. Choose other forms of birth control instead. For women who tend to get UTIs after sex, a  low-dose of a preventive antibiotic may be used. Be sure to discuss this option with your health care provider.    Follow up with your health care provider as directed. He or she may test to make sure the infection has cleared. If necessary, additional treatment may be started.  Date Last Reviewed: 9/8/2014 2000-2016 [employer]. [address] [place] [address], [place] [address]. All rights reserved. This information is not intended as a substitute for professional medical care. Always follow your healthcare professional's instructions.

## 2021-07-05 PROBLEM — E66.01 MORBID OBESITY (H): Status: ACTIVE | Noted: 2021-06-23

## 2021-07-17 ENCOUNTER — OFFICE VISIT (OUTPATIENT)
Dept: FAMILY MEDICINE | Facility: CLINIC | Age: 71
End: 2021-07-17
Payer: MEDICARE

## 2021-07-17 VITALS
DIASTOLIC BLOOD PRESSURE: 80 MMHG | OXYGEN SATURATION: 94 % | TEMPERATURE: 98.6 F | SYSTOLIC BLOOD PRESSURE: 144 MMHG | HEART RATE: 75 BPM | RESPIRATION RATE: 20 BRPM

## 2021-07-17 DIAGNOSIS — R05.9 COUGH: Primary | ICD-10-CM

## 2021-07-17 DIAGNOSIS — J45.901 MILD ASTHMA WITH EXACERBATION, UNSPECIFIED WHETHER PERSISTENT: ICD-10-CM

## 2021-07-17 PROCEDURE — 99214 OFFICE O/P EST MOD 30 MIN: CPT | Performed by: STUDENT IN AN ORGANIZED HEALTH CARE EDUCATION/TRAINING PROGRAM

## 2021-07-17 RX ORDER — PREDNISONE 20 MG/1
40 TABLET ORAL DAILY
Qty: 10 TABLET | Refills: 0 | Status: SHIPPED | OUTPATIENT
Start: 2021-07-17 | End: 2021-07-22

## 2021-07-17 NOTE — PATIENT INSTRUCTIONS
Take the prednisone daily for 5 days    You can take a spoonful of honey at night for nighttime cough since you cannot have a number of cough syrups    Follow up in 2-3 days if you are not improving    If you have fevers, chills, or feel worse, please come back or go the emergency department

## 2021-07-17 NOTE — PROGRESS NOTES
"    Assessment & Plan     Cough  Mild asthma with exacerbation, unspecified whether persistent  She has chest tightness and a history of asthma with improvement of use of her DuoNeb's.  There is likely a mild asthma exacerbation exacerbated by the poor air quality in her building, which she is moving out of in a few weeks.  Discussed that this would be important for her long-term health.  Discussed that using a short burst of prednisone will help with the breathing.  Discussed that she should come back if she were to have any worsening of symptoms, fever, or trouble breathing.  She has had asthma for a while so she is aware of this.  All questions answered at the end of the visit and return precautions discussed.  - predniSONE (DELTASONE) 20 MG tablet  Dispense: 10 tablet; Refill: 0  - Increase duo-nebs to every 4 hours  - can try honey at night for cough given allergies and inability to take numerous cough suppressants     Assessment requiring an independent historian(s) - family - family member  Diagnosis or treatment significantly limited by social determinants of health - living conditions worsen asthma symptoms  Prescription drug management       BMI:   Estimated body mass index is 35.43 kg/m  as calculated from the following:    Height as of 4/16/21: 1.6 m (5' 3\").    Weight as of 4/16/21: 90.7 kg (200 lb).     Return in about 3 days (around 7/20/2021) for Follow up.    Deborah Heller MD  [facility]    Claudia Granger is a 70 year old who presents for the following health issues  accompanied by her family member:  Chief Complaint   Patient presents with     Shortness of Breath     FATIGUE, COUGH WITH SOB, CHEST HURT WITH COUGH, 1 WEEK ON AND OFF. PT THINKS IT COULD BE RELATED TO THE AIR AT HOME. HX OF ASTHMA.        HPI     Her building is tearing up and doing construction. Then when she goes into the store she has to leave. She has a chest cold and feels tightness in her chest. " She does breathing treatments for her cough and that helps. She thinks it must be albuterol and she uses them 4 times per day. She has Advair for her daily inhaler and duo-nebs for her medications. She is moving in about 2 weeks. She is constantly catching colds. She doesn't have COPD. When she is outside she feels much better. She feels hot and clammy when it's really bad. She has to turn up her heat to help with the cough. She had a few chills at night. She is coughing. She thinks she has a little bit of a chest cold. At night she is coughing. She is taking coughing drops. She hasn't been vaccinated against COVID19 because she is working with her doctors to figure out if she allergic to the ingredients in the vaccine.     Review of Systems   See HPI      Objective    BP (!) 144/80 (BP Location: Right arm, Patient Position: Sitting, Cuff Size: Adult Large)   Pulse 75   Temp 98.6  F (37  C) (Oral)   Resp 20   SpO2 94%   There is no height or weight on file to calculate BMI.  Physical Exam  HENT:      Head: Normocephalic.   Cardiovascular:      Heart sounds: Normal heart sounds. No murmur heard.     Pulmonary:      Effort: No respiratory distress.      Breath sounds: Decreased air movement present. No stridor. No wheezing or rhonchi.      Comments: Speaking in full sentences but with shallow breaths and faint wheezing. No crackles.  Chest:      Chest wall: No tenderness.            No results found for this or any previous visit (from the past 24 hour(s)).

## 2021-10-28 ENCOUNTER — TELEPHONE (OUTPATIENT)
Dept: FAMILY MEDICINE | Facility: CLINIC | Age: 71
End: 2021-10-28

## 2021-10-28 NOTE — TELEPHONE ENCOUNTER
Chart reviewed by Ambulatory Quality and Data team    Please abstract the following data from this visit with this patient into the appropriate field in Epic:    Tests that can be patient reported without a hard copy:        Other Tests found in the patient's chart through Chart Review/Care Everywhere:    A1c done by this group [employer]  on this date: 4/9/21    Note to [employer]: If this section is blank, no results were found via Chart Review/Care Everywhere.

## 2021-11-04 PROCEDURE — 99284 EMERGENCY DEPT VISIT MOD MDM: CPT | Mod: 25

## 2021-11-04 PROCEDURE — 87636 SARSCOV2 & INF A&B AMP PRB: CPT | Performed by: EMERGENCY MEDICINE

## 2021-11-04 PROCEDURE — C9803 HOPD COVID-19 SPEC COLLECT: HCPCS

## 2021-11-05 ENCOUNTER — APPOINTMENT (OUTPATIENT)
Dept: RADIOLOGY | Facility: CLINIC | Age: 71
End: 2021-11-05
Attending: EMERGENCY MEDICINE
Payer: MEDICARE

## 2021-11-05 ENCOUNTER — HOSPITAL ENCOUNTER (EMERGENCY)
Facility: CLINIC | Age: 71
Discharge: HOME | End: 2021-11-05
Attending: EMERGENCY MEDICINE | Admitting: EMERGENCY MEDICINE
Payer: MEDICARE

## 2021-11-05 VITALS
SYSTOLIC BLOOD PRESSURE: 170 MMHG | WEIGHT: 150 LBS | TEMPERATURE: 97 F | RESPIRATION RATE: 22 BRPM | HEART RATE: 65 BPM | BODY MASS INDEX: 26.57 KG/M2 | OXYGEN SATURATION: 97 % | DIASTOLIC BLOOD PRESSURE: 78 MMHG

## 2021-11-05 DIAGNOSIS — R09.81 SINUS CONGESTION: ICD-10-CM

## 2021-11-05 DIAGNOSIS — Z11.52 ENCOUNTER FOR SCREENING FOR COVID-19: ICD-10-CM

## 2021-11-05 PROBLEM — L30.9 DERMATITIS: Status: ACTIVE | Noted: 2020-06-25

## 2021-11-05 PROBLEM — G81.94 LEFT HEMIPARESIS (H): Status: ACTIVE | Noted: 2021-01-06

## 2021-11-05 PROBLEM — M94.0 COSTOCHONDRITIS: Status: ACTIVE | Noted: 2021-01-10

## 2021-11-05 PROBLEM — E11.65 UNCONTROLLED TYPE 2 DIABETES MELLITUS WITH HYPERGLYCEMIA (H): Status: ACTIVE | Noted: 2020-06-25

## 2021-11-05 PROBLEM — G81.90 HEMIPLEGIA AFFECTING NONDOMINANT SIDE (H): Status: ACTIVE | Noted: 2021-01-06

## 2021-11-05 LAB
FLUAV RNA SPEC QL NAA+PROBE: NEGATIVE
FLUBV RNA RESP QL NAA+PROBE: NEGATIVE
SARS-COV-2 RNA RESP QL NAA+PROBE: NEGATIVE

## 2021-11-05 PROCEDURE — 71046 X-RAY EXAM CHEST 2 VIEWS: CPT

## 2021-11-05 ASSESSMENT — ENCOUNTER SYMPTOMS
CONFUSION: 0
SHORTNESS OF BREATH: 0
FACIAL SWELLING: 0
SINUS PAIN: 1
EYE PAIN: 0
HEADACHES: 1
SINUS PRESSURE: 1
FEVER: 0
WOUND: 0

## 2021-11-05 NOTE — ED PROVIDER NOTES
EMERGENCY DEPARTMENT ENCOUNTER      NAME: Bárbara Perez  AGE: 71 year old female  YOB: 1950  MRN: [id number]  EVALUATION DATE & TIME: No admission date for patient encounter.    PCP: Carolyn Carr        Chief Complaint   Patient presents with     Covid Concern     Facial Pain         FINAL IMPRESSION:  1. Sinus congestion    2. Encounter for screening for COVID-19          ED COURSE & MEDICAL DECISION MAKING:    Pertinent Labs & Imaging studies reviewed. (See chart for details)  71 year old female presents to the Emergency Department for evaluation of 2 days of sinus congestion, requesting Covid testing.  Not vaccinated against Covid.    Prone to sinus infections.   ACS, glaucoma, bacterial sinusitis unlikely based on history and exam.  Most likely allergies or viral process causing her symptoms at this point.     Patient requesting chest x-ray because she is prone to pneumonia.  X-ray normal.  Covid test is negative.  Elevated blood pressure but took Sudafed when she reports she knows she is not supposed to.  Has Flonase at home.  Given sinusitis self-care instructions.    11:50 AM I met with the patient and performed my initial exam.  I discussed the plan for care and the patient is agreeable with this plan. PPE: Provider wore gloves, N95 mask, eye protection.    1:34 AM I rechecked and updated the patient. I discussed the plan for discharge with the patient, and patient is agreeable. We discussed supportivecares at home and reasons for return to the ER including new or worsening symptoms - all questions and concerns addressed. Patient to be discharged by RN.      ED Course as of Nov 05 0146 Fri Nov 05, 2021   0031 Patient is vaccinated against Covid but had an exposure yesterday.  Has 2 days of sinus congestion and states she always gets antibiotics when she has 2 days or more of symptoms.  Is hypertensive but did take Sudafed which patient states she knows she is not supposed to take.   Patient states she is prone to pneumonia.  Afebrile.  Is on Flonase.      0032 Clinically has signs of sinusitis which is likely viral based on duration of symptoms      0032 Covid test ordered.  Plan for chest x-ray.      0133 SARS CoV2 PCR: Negative         At the conclusion of the encounter I discussed the results of all of the tests and the disposition. The questions were answered. The patient or family acknowledged understanding and was agreeable with the care plan.         MEDICATIONS GIVEN IN THE EMERGENCY:  Medications - No data to display    NEW PRESCRIPTIONS STARTED AT TODAY'S ER VISIT  New Prescriptions    No medications on file            =================================================================    HPI    Patient information was obtained from: the patient    Use of Intrepreter: N/A      Bárbara Perez is a 71 year old female with a pertinent history of GERD, CAD, obese, NSTEMI, CABP, COPD, asthma, anxiety, hypertension, type II diabetes mellitus, ACS, obesity who presents to this ED by personal vehicle for evaluation of COVID-19 concern, facial pain.    The patient reports ~2 days of nasal congestion and took pseudoephedrine which she explained she is not supposed to take. She also reports sneezing and pressure behind her bilateral eyes.  She presented to Urgent Care, but this was full so she presented here.      The patient denies any other symptoms at this time.     MHx: The patient reports a history of infections during which her doctor will often prescribe antibiotics.  The patient is vaccinated for COVID-19. History of pneumonia.  SHx: The patient has a COVID-19 exposure yesterday.       REVIEW OF SYSTEMS   Review of Systems   Constitutional: Negative for fever.   HENT: Positive for congestion, sinus pressure, sinus pain and sneezing. Negative for facial swelling.    Eyes: Negative for pain (pressure).   Respiratory: Negative for shortness of breath.    Cardiovascular: Negative for chest  pain.   Skin: Negative for wound.   Neurological: Positive for headaches.   Psychiatric/Behavioral: Negative for confusion.         PAST MEDICAL HISTORY:  Past Medical History:   Diagnosis Date     Asthma      CAD (coronary artery disease)      Chest pain      COPD (chronic obstructive pulmonary disease) (H)      Diabetes mellitus (H)      GERD (gastroesophageal reflux disease)      Heart attack (H)      HTN (hypertension)      Hypothyroidism      Psoriasis      Seasonal allergies      Sternum pain     Following Cardiac Bypass       PAST SURGICAL HISTORY:  Past Surgical History:   Procedure Laterality Date     APPENDECTOMY       CARDIAC CATHETERIZATION  01/26/2017     CARDIAC CATHETERIZATION N/A 1/26/2017    Procedure: Coronary Angiogram;  Surgeon: Arsen Anderson MD;  Location: [facility] Cath Lab;  Service:      CARDIAC SURGERY  2008    Triple Bypass     CV CORONARY ANGIOGRAM N/A 9/7/2020    Procedure: Coronary Angiogram;  Surgeon: Adriana Schultz MD;  Location: [facility] Cath Lab;  Service: Cardiology     CV LEFT HEART CATHETERIZATION WITHOUT LEFT VENTRICULOGRAM Left 9/7/2020    Procedure: Left Heart Catheterization Without Left Ventriculogram;  Surgeon: Adriana Schultz MD;  Location: [facility] Cath Lab;  Service: Cardiology     HYSTERECTOMY      age 30's     WRIST SURGERY Left     Ligament Surgery           CURRENT MEDICATIONS:    Patient's Medications   New Prescriptions    No medications on file   Previous Medications    ACETAMINOPHEN (TYLENOL) 500 MG TABLET    [ACETAMINOPHEN (TYLENOL) 500 MG TABLET] Take 1,000 mg by mouth 2 (two) times a day.     ADVAIR -21 MCG/ACTUATION INHALER    [ADVAIR -21 MCG/ACTUATION INHALER] Inhale 2 puffs 2 (two) times a day.    ALBUTEROL (PROVENTIL) 5 MG/ML NEBULIZER SOLUTION    [ALBUTEROL (PROVENTIL) 5 MG/ML NEBULIZER SOLUTION] Three times daily for 5 days then q 6hprn    ASPIRIN 81 MG CHEWABLE TABLET    [ASPIRIN 81 MG CHEWABLE TABLET] Chew 81 mg daily.      BLOOD GLUCOSE TEST (ACCU-CHEK ACTIVE TEST) STRIPS    [BLOOD GLUCOSE TEST (ACCU-CHEK ACTIVE TEST) STRIPS] Use 1 each As Directed as needed. Dispense brand per patient's insurance at pharmacy discretion.    BLOOD GLUCOSE TEST (GLUCOSE BLOOD) STRIPS    [BLOOD GLUCOSE TEST (GLUCOSE BLOOD) STRIPS] Test once daily    CLOPIDOGREL (PLAVIX) 75 MG TABLET    [CLOPIDOGREL (PLAVIX) 75 MG TABLET] Take 1 tablet (75 mg total) by mouth daily. Dose to start tomorrow    COMPRESSOR, FOR NEBULIZER YOGESH    [COMPRESSOR, FOR NEBULIZER YOGESH] To use with albuterol as needed for wheezing and shortness of breath    DIPHENHYDRAMINE (BENADRYL) 25 MG CAPSULE    [DIPHENHYDRAMINE (BENADRYL) 25 MG CAPSULE] Take 25 mg by mouth 2 (two) times a day. With pain medications    EZETIMIBE (ZETIA) 10 MG TABLET    [EZETIMIBE (ZETIA) 10 MG TABLET] Take 1 tablet (10 mg total) by mouth at bedtime.    FLUTICASONE (FLONASE) 50 MCG/ACTUATION NASAL SPRAY    [FLUTICASONE (FLONASE) 50 MCG/ACTUATION NASAL SPRAY] 2 sprays into each nostril at bedtime.     GENERIC LANCETS (ACCU-CHEK MULTICLIX LANCET)    [GENERIC LANCETS (ACCU-CHEK MULTICLIX LANCET)] TESTING ONCE DAILY    GLIPIZIDE (GLUCOTROL) 5 MG TABLET    [GLIPIZIDE (GLUCOTROL) 5 MG TABLET] Take 2.5 mg by mouth 2 (two) times a day before meals.    IPRATROPIUM-ALBUTEROL (DUO-NEB) 0.5-2.5 MG/3 ML NEBULIZER    [IPRATROPIUM-ALBUTEROL (DUO-NEB) 0.5-2.5 MG/3 ML NEBULIZER] Take 3 mL by nebulization 4 (four) times a day.     LEVOTHYROXINE (SYNTHROID, LEVOTHROID) 50 MCG TABLET    [LEVOTHYROXINE (SYNTHROID, LEVOTHROID) 50 MCG TABLET] Take 50 mcg by mouth 3 (three) times a day.     LORATADINE (CLARITIN) 10 MG TABLET    [LORATADINE (CLARITIN) 10 MG TABLET] Take 10 mg by mouth daily.    METOPROLOL TARTRATE (LOPRESSOR) 25 MG TABLET    [METOPROLOL TARTRATE (LOPRESSOR) 25 MG TABLET] Take 0.5 tablets (12.5 mg total) by mouth 2 (two) times a day.    NAPROXEN (NAPROSYN) 500 MG TABLET    [NAPROXEN (NAPROSYN) 500 MG TABLET] Take 1  tablet (500 mg total) by mouth 2 (two) times a day with meals.    NITROGLYCERIN (NITROSTAT) 0.4 MG SL TABLET    [NITROGLYCERIN (NITROSTAT) 0.4 MG SL TABLET] Place 1 tablet (0.4 mg total) under the tongue every 5 (five) minutes as needed for chest pain.    OXYCODONE-ACETAMINOPHEN (PERCOCET/ENDOCET) 5-325 MG PER TABLET    [OXYCODONE-ACETAMINOPHEN (PERCOCET/ENDOCET) 5-325 MG PER TABLET] Take 1-2 tablets by mouth every 4 (four) hours as needed for pain.    PROAIR HFA 90 MCG/ACTUATION INHALER    [PROAIR HFA 90 MCG/ACTUATION INHALER] Inhale 1-2 puffs every 6 (six) hours as needed for wheezing or shortness of breath.     SIMETHICONE (MYLICON) 80 MG CHEWABLE TABLET    [SIMETHICONE (MYLICON) 80 MG CHEWABLE TABLET] Chew 160 mg 2 (two) times a day.    TRIAMCINOLONE (KENALOG) 0.1 % CREAM    [TRIAMCINOLONE (KENALOG) 0.1 % CREAM] Apply 1 application topically 3 (three) times a day. For psoriasis   Modified Medications    No medications on file   Discontinued Medications    No medications on file       ALLERGIES:  Allergies   Allergen Reactions     Atorvastatin Shortness Of Breath and Swelling     Was previously deleted but on current encounter (6/20/17), patient told me to add as an allergy.      Cefdinir Shortness Of Breath and Itching     Dye [External Allergen Needs Reconciliation - See Comment] Other (See Comments)     Allergy to all dyes.  Cannot take any medications with dyes or color. Causes rapid heartrate, sweating.  Patient unsure of other symptoms but states they are severe.     Ezetimibe Shortness Of Breath and Swelling     Spoke with patient 9/8/20, stated reaction was shoulder pain, and she never experienced shortness of breath from zetia.      Latex Anaphylaxis     Penicillin V Shortness Of Breath and Rash     Cetirizine Unknown     unknown     Chocolate Laxative [Senna] Unknown     Codeine Unknown     Egg Derived [Chicken-Derived Products (Egg)] Unknown     Fenofibrate Unknown     Influenza Virus Vaccines  [Influenza Vaccines] Unknown     Iodine Unknown     Ioxaglate Sodium [Ioxaglate] Itching     Latex Unknown     Peanut Oil Unknown     Toradol [Ketorolac] Unknown     Ulcers, takes aspirin daily at home and Aleve as needed       FAMILY HISTORY:  Family History   Problem Relation Age of Onset     Arthritis Mother      Kidney Disease Father      Heart Disease Sister      Colon Cancer Brother      Cancer Maternal Aunt        SOCIAL HISTORY:   Social History     Socioeconomic History     Marital status:      Spouse name: Not on file     Number of children: Not on file     Years of education: Not on file     Highest education level: Not on file   Occupational History     Not on file   Tobacco Use     Smoking status: Former Smoker     Packs/day: 1.00     Years: 30.00     Pack years: 30.00     Smokeless tobacco: Never Used     Tobacco comment: Smoking Cessation Packet provided   Substance and Sexual Activity     Alcohol use: No     Drug use: No     Sexual activity: Never   Other Topics Concern     Not on file   Social History Narrative    Patient presented to the ED with her  with a complaint of pain and cough 01/22/17       Social Determinants of Health     Financial Resource Strain:      Difficulty of Paying Living Expenses:    Food Insecurity:      Worried About Running Out of Food in the Last Year:      Ran Out of Food in the Last Year:    Transportation Needs:      Lack of Transportation (Medical):      Lack of Transportation (Non-Medical):    Physical Activity:      Days of Exercise per Week:      Minutes of Exercise per Session:    Stress:      Feeling of Stress :    Social Connections:      Frequency of Communication with Friends and Family:      Frequency of Social Gatherings with Friends and Family:      Attends [employer] Services:      Active Member of Clubs or Organizations:      Attends Club or Organization Meetings:      Marital Status:    Intimate Partner Violence:      Fear of Current or  Ex-Partner:      Emotionally Abused:      Physically Abused:      Sexually Abused:        VITALS:  Patient Vitals for the past 24 hrs:   BP Temp Temp src Pulse Resp SpO2 Weight   11/04/21 2354 (!) 190/96 97  F (36.1  C) Temporal 65 22 97 % 68 kg (150 lb)       PHYSICAL EXAM      Vitals: BP (!) 190/96   Pulse 65   Temp 97  F (36.1  C) (Temporal)   Resp 22   Wt 68 kg (150 lb)   SpO2 97%   BMI 26.57 kg/m    General: Appears in no acute distress, awake, alert, interactive.  Eyes: Conjunctivae non-injected. Sclera anicteric.  HENT: Atraumatic. Bilateral maxillary tenderness.   Neck: Supple.  Respiratory/Chest: Respiration unlabored. No wheezing.  No crackles.  Heart: RRR  Abdomen: non distended  Musculoskeletal: Normal extremities. No edema or erythema.  Skin: Normal color. No rash or diaphoresis.  Neurologic: Face symmetric, moves all extremities spontaneously. Speech clear.  Psychiatric: Oriented to person, place, and time. Affect appropriate.    LAB:  All pertinent labs reviewed and interpreted.  Results for orders placed or performed during the hospital encounter of 11/05/21   Chest XR,  PA & LAT    Impression    IMPRESSION: Poststernotomy. Stable mild cardiomegaly. Normal pulmonary vascularity. Lungs appear clear. No significant pleural fluid. No pneumothorax.   Symptomatic Influenza A/B & SARS-CoV2 (COVID-19) Virus PCR Multiplex Nasopharyngeal    Specimen: Nasopharyngeal; Swab   Result Value Ref Range    Influenza A target Negative Negative    Influenza B target Negative Negative    SARS CoV2 PCR Negative Negative       RADIOLOGY:  Reviewed all pertinent imaging. Please see official radiology report.  Chest XR,  PA & LAT   Final Result   IMPRESSION: Poststernotomy. Stable mild cardiomegaly. Normal pulmonary vascularity. Lungs appear clear. No significant pleural fluid. No pneumothorax.              PROCEDURES:   None      Valente HOLM, am serving as a scribe to document services personally performed by  Dr. Zhu based on my observation and the provider's statements to me. I, Tej Zhu MD attest that Valente Sepulveda is acting in a scribe capacity, has observed my performance of the services and has documented them in accordance with my direction.    Tej Zhu M.D.  Emergency Medicine  [facility] EMERGENCY ROOM  [address] [address]  [phone]  Dept: [phone]     Rishabh Zhu MD  11/05/21 0146

## 2021-11-05 NOTE — ED TRIAGE NOTES
Pt presents with covid exposure yesterday.  is a cancer patient. Pt is not vaccinated. Also states facial pain x 2 days, hx sinus infections.

## 2021-11-05 NOTE — DISCHARGE INSTRUCTIONS
Currently antibiotics are not indicated for your sinus congestion.  Your chest x-ray is negative and does not show pneumonia.  Your Covid test is negative which is great news. Follow up with your doctor if not improving. Return to the ER for worsening symptoms.

## 2021-11-07 ENCOUNTER — OFFICE VISIT (OUTPATIENT)
Dept: FAMILY MEDICINE | Facility: CLINIC | Age: 71
End: 2021-11-07
Payer: MEDICARE

## 2021-11-07 VITALS
DIASTOLIC BLOOD PRESSURE: 70 MMHG | BODY MASS INDEX: 31.35 KG/M2 | RESPIRATION RATE: 18 BRPM | HEART RATE: 69 BPM | TEMPERATURE: 98.4 F | SYSTOLIC BLOOD PRESSURE: 118 MMHG | OXYGEN SATURATION: 97 % | WEIGHT: 177 LBS

## 2021-11-07 DIAGNOSIS — J01.90 ACUTE SINUSITIS WITH SYMPTOMS GREATER THAN 10 DAYS: Primary | ICD-10-CM

## 2021-11-07 PROCEDURE — 99213 OFFICE O/P EST LOW 20 MIN: CPT | Performed by: FAMILY MEDICINE

## 2021-11-07 RX ORDER — SULFAMETHOXAZOLE/TRIMETHOPRIM 800-160 MG
1 TABLET ORAL 2 TIMES DAILY
Qty: 20 TABLET | Refills: 0 | Status: SHIPPED | OUTPATIENT
Start: 2021-11-07 | End: 2021-11-17

## 2021-11-07 NOTE — PROGRESS NOTES
Assessment & Plan     Acute sinusitis with symptoms greater than 10 days    - sulfamethoxazole-trimethoprim (BACTRIM DS) 800-160 MG tablet; Take 1 tablet by mouth 2 times daily for 10 days    Treat with Bactrim.  Recommend increased fluids and rest and complete smoking cessation.  Close Follow-up if no change or worsening sx prn.    Katelyn Bender MD  [facility] REGAN Granger is a 71 year old who presents for the following health issues     HPI     Worsening sinus pain and pressure for > 1 week now per patient.  Was in ED 2 days ago and was told to wait a few more days to see if it would resolve on its own-- she now is back and wanting Abx.    States sx have been present for > 1 week and with hx of recurrent sinusitis she knows she needs Abx.  Is a smoker.  No fever or chills.  Not yet vaccinated against COVID because of allergy to component of vaccine per patient- followed at [facility]- negative COVID test in ED.      Review of Systems   Constitutional, HEENT, cardiovascular, pulmonary, GI, , musculoskeletal, neuro, skin, endocrine and psych systems are negative, except as otherwise noted.      Objective    /70   Pulse 69   Temp 98.4  F (36.9  C) (Oral)   Resp 18   Wt 80.3 kg (177 lb)   SpO2 97%   BMI 31.35 kg/m    Body mass index is 31.35 kg/m .  Physical Exam   GENERAL: healthy, alert and no distress  EYES: Eyes grossly normal to inspection, PERRL and conjunctivae and sclerae normal  HENT: ear canals and TM's normal, nose and mouth without ulcers or lesions, swollen RIGHT anterior turbinate ++, purulent drainage, + sinus pain and pressure over B frontal and maxillary sinuses.  NECK: no adenopathy, no asymmetry, masses, or scars and thyroid normal to palpation  RESP: lungs clear to auscultation - no rales, rhonchi or wheezes  CV: regular rate and rhythm, normal S1 S2, no S3 or S4, no murmur, click or rub, no peripheral edema and peripheral pulses strong  MS: no  gross musculoskeletal defects noted, no edema  PSYCH: mentation appears normal, affect normal/bright

## 2021-12-10 ENCOUNTER — HOSPITAL ENCOUNTER (EMERGENCY)
Facility: CLINIC | Age: 71
Discharge: HOME | End: 2021-12-10
Attending: EMERGENCY MEDICINE | Admitting: EMERGENCY MEDICINE
Payer: MEDICARE

## 2021-12-10 ENCOUNTER — APPOINTMENT (OUTPATIENT)
Dept: RADIOLOGY | Facility: CLINIC | Age: 71
End: 2021-12-10
Attending: EMERGENCY MEDICINE
Payer: MEDICARE

## 2021-12-10 VITALS
SYSTOLIC BLOOD PRESSURE: 143 MMHG | HEART RATE: 72 BPM | TEMPERATURE: 97.5 F | BODY MASS INDEX: 32.59 KG/M2 | WEIGHT: 184 LBS | RESPIRATION RATE: 18 BRPM | DIASTOLIC BLOOD PRESSURE: 74 MMHG | OXYGEN SATURATION: 95 %

## 2021-12-10 DIAGNOSIS — E86.0 DEHYDRATION: ICD-10-CM

## 2021-12-10 DIAGNOSIS — E87.6 HYPOKALEMIA: ICD-10-CM

## 2021-12-10 DIAGNOSIS — U07.1 INFECTION DUE TO 2019 NOVEL CORONAVIRUS: ICD-10-CM

## 2021-12-10 DIAGNOSIS — R11.0 NAUSEA: ICD-10-CM

## 2021-12-10 LAB
ALBUMIN SERPL-MCNC: 2.8 G/DL (ref 3.5–5)
ALP SERPL-CCNC: 125 U/L (ref 45–120)
ALT SERPL W P-5'-P-CCNC: 12 U/L (ref 0–45)
ANION GAP SERPL CALCULATED.3IONS-SCNC: 10 MMOL/L (ref 5–18)
AST SERPL W P-5'-P-CCNC: 14 U/L (ref 0–40)
BASOPHILS # BLD AUTO: 0 10E3/UL (ref 0–0.2)
BASOPHILS NFR BLD AUTO: 0 %
BILIRUB SERPL-MCNC: 0.4 MG/DL (ref 0–1)
BUN SERPL-MCNC: 12 MG/DL (ref 8–28)
C REACTIVE PROTEIN LHE: 14 MG/DL (ref 0–0.8)
CALCIUM SERPL-MCNC: 8.7 MG/DL (ref 8.5–10.5)
CHLORIDE BLD-SCNC: 106 MMOL/L (ref 98–107)
CO2 SERPL-SCNC: 28 MMOL/L (ref 22–31)
CREAT SERPL-MCNC: 1.04 MG/DL (ref 0.6–1.1)
EOSINOPHIL # BLD AUTO: 0.1 10E3/UL (ref 0–0.7)
EOSINOPHIL NFR BLD AUTO: 1 %
ERYTHROCYTE [DISTWIDTH] IN BLOOD BY AUTOMATED COUNT: 19.9 % (ref 10–15)
GFR SERPL CREATININE-BSD FRML MDRD: 54 ML/MIN/1.73M2
GLUCOSE BLD-MCNC: 116 MG/DL (ref 70–125)
HCT VFR BLD AUTO: 34.5 % (ref 35–47)
HGB BLD-MCNC: 11 G/DL (ref 11.7–15.7)
IMM GRANULOCYTES # BLD: 0 10E3/UL
IMM GRANULOCYTES NFR BLD: 0 %
LYMPHOCYTES # BLD AUTO: 1.3 10E3/UL (ref 0.8–5.3)
LYMPHOCYTES NFR BLD AUTO: 16 %
MCH RBC QN AUTO: 26.8 PG (ref 26.5–33)
MCHC RBC AUTO-ENTMCNC: 31.9 G/DL (ref 31.5–36.5)
MCV RBC AUTO: 84 FL (ref 78–100)
MONOCYTES # BLD AUTO: 0.6 10E3/UL (ref 0–1.3)
MONOCYTES NFR BLD AUTO: 7 %
NEUTROPHILS # BLD AUTO: 6 10E3/UL (ref 1.6–8.3)
NEUTROPHILS NFR BLD AUTO: 76 %
NRBC # BLD AUTO: 0 10E3/UL
NRBC BLD AUTO-RTO: 0 /100
PLATELET # BLD AUTO: 329 10E3/UL (ref 150–450)
POTASSIUM BLD-SCNC: 3.1 MMOL/L (ref 3.5–5)
PROT SERPL-MCNC: 7.1 G/DL (ref 6–8)
RBC # BLD AUTO: 4.11 10E6/UL (ref 3.8–5.2)
SODIUM SERPL-SCNC: 144 MMOL/L (ref 136–145)
TROPONIN I SERPL-MCNC: <0.01 NG/ML (ref 0–0.29)
WBC # BLD AUTO: 8 10E3/UL (ref 4–11)

## 2021-12-10 PROCEDURE — 84484 ASSAY OF TROPONIN QUANT: CPT | Performed by: STUDENT IN AN ORGANIZED HEALTH CARE EDUCATION/TRAINING PROGRAM

## 2021-12-10 PROCEDURE — 86141 C-REACTIVE PROTEIN HS: CPT | Performed by: EMERGENCY MEDICINE

## 2021-12-10 PROCEDURE — 250N000011 HC RX IP 250 OP 636: Performed by: EMERGENCY MEDICINE

## 2021-12-10 PROCEDURE — 85025 COMPLETE CBC W/AUTO DIFF WBC: CPT | Performed by: STUDENT IN AN ORGANIZED HEALTH CARE EDUCATION/TRAINING PROGRAM

## 2021-12-10 PROCEDURE — 93005 ELECTROCARDIOGRAM TRACING: CPT | Performed by: EMERGENCY MEDICINE

## 2021-12-10 PROCEDURE — 96375 TX/PRO/DX INJ NEW DRUG ADDON: CPT

## 2021-12-10 PROCEDURE — 258N000003 HC RX IP 258 OP 636: Performed by: EMERGENCY MEDICINE

## 2021-12-10 PROCEDURE — 96365 THER/PROPH/DIAG IV INF INIT: CPT

## 2021-12-10 PROCEDURE — 82040 ASSAY OF SERUM ALBUMIN: CPT | Performed by: STUDENT IN AN ORGANIZED HEALTH CARE EDUCATION/TRAINING PROGRAM

## 2021-12-10 PROCEDURE — 71045 X-RAY EXAM CHEST 1 VIEW: CPT

## 2021-12-10 PROCEDURE — 96366 THER/PROPH/DIAG IV INF ADDON: CPT

## 2021-12-10 PROCEDURE — 36415 COLL VENOUS BLD VENIPUNCTURE: CPT | Performed by: STUDENT IN AN ORGANIZED HEALTH CARE EDUCATION/TRAINING PROGRAM

## 2021-12-10 PROCEDURE — 99285 EMERGENCY DEPT VISIT HI MDM: CPT | Mod: 25

## 2021-12-10 RX ORDER — ONDANSETRON 2 MG/ML
4 INJECTION INTRAMUSCULAR; INTRAVENOUS EVERY 30 MIN PRN
Status: DISCONTINUED | OUTPATIENT
Start: 2021-12-10 | End: 2021-12-10 | Stop reason: HOSPADM

## 2021-12-10 RX ORDER — ONDANSETRON 4 MG/1
4 TABLET, ORALLY DISINTEGRATING ORAL EVERY 6 HOURS PRN
Qty: 10 TABLET | Refills: 0 | Status: SHIPPED | OUTPATIENT
Start: 2021-12-10 | End: 2021-12-13

## 2021-12-10 RX ORDER — POTASSIUM CHLORIDE 750 MG/1
20 TABLET, EXTENDED RELEASE ORAL 2 TIMES DAILY
Qty: 16 TABLET | Refills: 0 | Status: SHIPPED | OUTPATIENT
Start: 2021-12-10 | End: 2021-12-14

## 2021-12-10 RX ORDER — POTASSIUM CHLORIDE 7.45 MG/ML
10 INJECTION INTRAVENOUS ONCE
Status: COMPLETED | OUTPATIENT
Start: 2021-12-10 | End: 2021-12-10

## 2021-12-10 RX ADMIN — SODIUM CHLORIDE 1000 ML: 9 INJECTION, SOLUTION INTRAVENOUS at 18:54

## 2021-12-10 RX ADMIN — POTASSIUM CHLORIDE 10 MEQ: 7.46 INJECTION, SOLUTION INTRAVENOUS at 18:55

## 2021-12-10 RX ADMIN — ONDANSETRON 4 MG: 2 INJECTION INTRAMUSCULAR; INTRAVENOUS at 18:55

## 2021-12-10 ASSESSMENT — ENCOUNTER SYMPTOMS
FEVER: 1
CHILLS: 1
NAUSEA: 1
SHORTNESS OF BREATH: 1

## 2021-12-10 NOTE — ED PROVIDER NOTES
Emergency Department Encounter      NAME: Bárbara Perez  AGE: 71 year old female  YOB: 1950  MRN: [id number]  EVALUATION DATE & TIME: 12/10/2021  5:17 PM    PCP: Carolyn Carr    ED PROVIDER: Pranav Dumont M.D.      Chief Complaint   Patient presents with     Shortness of Breath     Nausea     Covid Concern       FINAL IMPRESSION:  1. Hypokalemia    2. Nausea    3. Dehydration    4. Infection due to 2019 novel coronavirus        MEDICAL DECISION MAKIN:57 PM I met with the patient, obtained history, performed an initial exam, and discussed options and plan for diagnostics and treatment here in the ED. PPE worn: N95, eye protection, gloves, gown.   8:07 PM Rechecked patient and updated on results. Discussed plan for discharge - patient agreeable    This patient is a 71-year-old female with a history of asthma, COPD, diabetes and hypertension who presents with shortness of breath.  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  The patient herself tested positive for Covid on .  Today she has been having fevers and chills.  She has been nauseous.  She developed shortness of breath.  She did try her nebulizer treatment without relief.  Her doctor had been treating her with prednisone for the past 2 days for her shortness of breath but because her blood sugars have been elevated she has stopped taking this.  Although the patient was short of breath she was satting in the mid 90s on room air.  Her lungs were clear to auscultation and she was not tachypneic.  She was complaining of feeling nauseous and felt dehydrated.  The patient was given antiemetics and IV fluids in the ER.  A chest x-ray was done and did not show any evidence of pneumonia.  After the IV fluids antiemetics the patient was feeling much better and wanted to be discharged home.  Her sats remained in the mid 90s but I discussed that she needs to carefully monitor this at home.  She was  discharged home with a pulse oximeter.  She does have somebody calling her daily to check on her so I did not order the get well loop as this seemed to be redundant.  We discussed the monoclonal antibodies and she was provided the contact information for this as well.    Pertinent Labs & Imaging studies reviewed. (See chart for details)      The importance of close follow up was discussed. We reviewed warning signs and symptoms, and I instructed Ms. Perez to return to the emergency department immediately if she develops any new or worsening symptoms. I provided additional verbal discharge instructions. Ms. Perez expressed understanding and agreement with this plan of care, her questions were answered, and she was discharged in stable condition.    MEDICATIONS GIVEN IN THE EMERGENCY:  Medications   0.9% sodium chloride BOLUS (0 mLs Intravenous Stopped 12/10/21 2055)   potassium chloride 10 mEq in 100 mL sterile water intermittent infusion (premix) (0 mEq Intravenous Stopped 12/10/21 2055)       NEW PRESCRIPTIONS STARTED AT TODAY'S ER VISIT:  Discharge Medication List as of 12/10/2021  8:56 PM      START taking these medications    Details   ondansetron (ZOFRAN ODT) 4 MG ODT tab Take 1 tablet (4 mg) by mouth every 6 hours as needed for nausea, Disp-10 tablet, R-0, Local Print      potassium chloride ER (KLOR-CON M) 10 MEQ CR tablet Take 2 tablets (20 mEq) by mouth 2 times daily for 4 days, Disp-16 tablet, R-0, Local Print           =================================================================    HPI    Patient information was obtained from: Patient    Use of : N/A      Bárbara Perez is a 71 year old female with a past medical history of asthma, COPD, type II DM, HLD, HTN, CAD, NSTEMI, who presents by private car for evaluation of shortness of breath. Patient reports her PCA tested positive for COVID-19 earlier this week. Patient tested positive for COVID-19 on Tuesday 12/7/21. This morning, she  developed fever, chills, nausea, and some shortness of breath. She has tried nebulizer treatment without any improvement. She reports her PCP has had her on prednisone for 2 days, but sent patient here to the ED when she learned the patient was short of breath and Covid positive. Patient denies any other concerns at this time.    REVIEW OF SYSTEMS   Review of Systems   Constitutional: Positive for chills and fever.   Respiratory: Positive for shortness of breath.    Gastrointestinal: Positive for nausea.   All other systems reviewed and are negative.       PAST MEDICAL HISTORY:  Past Medical History:   Diagnosis Date     Asthma      CAD (coronary artery disease)      Chest pain      COPD (chronic obstructive pulmonary disease) (H)      Diabetes mellitus (H)      GERD (gastroesophageal reflux disease)      Heart attack (H)      HTN (hypertension)      Hypothyroidism      Psoriasis      Seasonal allergies      Sternum pain     Following Cardiac Bypass       PAST SURGICAL HISTORY:  Past Surgical History:   Procedure Laterality Date     APPENDECTOMY       CARDIAC CATHETERIZATION  01/26/2017     CARDIAC CATHETERIZATION N/A 1/26/2017    Procedure: Coronary Angiogram;  Surgeon: Arsen Anderson MD;  Location: [facility] Cath Lab;  Service:      CARDIAC SURGERY  2008    Triple Bypass     CV CORONARY ANGIOGRAM N/A 9/7/2020    Procedure: Coronary Angiogram;  Surgeon: Adriana Schultz MD;  Location: [facility] Cath Lab;  Service: Cardiology     CV LEFT HEART CATHETERIZATION WITHOUT LEFT VENTRICULOGRAM Left 9/7/2020    Procedure: Left Heart Catheterization Without Left Ventriculogram;  Surgeon: Adriana Schultz MD;  Location: [facility] Cath Lab;  Service: Cardiology     HYSTERECTOMY      age 30's     WRIST SURGERY Left     Ligament Surgery       CURRENT MEDICATIONS:    No current facility-administered medications for this encounter.    Current Outpatient Medications:      ondansetron (ZOFRAN ODT) 4 MG ODT tab, Take 1  tablet (4 mg) by mouth every 6 hours as needed for nausea, Disp: 10 tablet, Rfl: 0     potassium chloride ER (KLOR-CON M) 10 MEQ CR tablet, Take 2 tablets (20 mEq) by mouth 2 times daily for 4 days, Disp: 16 tablet, Rfl: 0     acetaminophen (TYLENOL) 500 MG tablet, [ACETAMINOPHEN (TYLENOL) 500 MG TABLET] Take 1,000 mg by mouth 2 (two) times a day. , Disp: , Rfl:      ADVAIR -21 mcg/actuation inhaler, [ADVAIR -21 MCG/ACTUATION INHALER] Inhale 2 puffs 2 (two) times a day., Disp: , Rfl:      albuterol (PROVENTIL) 5 mg/mL nebulizer solution, [ALBUTEROL (PROVENTIL) 5 MG/ML NEBULIZER SOLUTION] Three times daily for 5 days then q 6hprn, Disp: 20 mL, Rfl: 0     aspirin 81 mg chewable tablet, [ASPIRIN 81 MG CHEWABLE TABLET] Chew 81 mg daily. , Disp: , Rfl:      blood glucose test (ACCU-CHEK ACTIVE TEST) strips, [BLOOD GLUCOSE TEST (ACCU-CHEK ACTIVE TEST) STRIPS] Use 1 each As Directed as needed. Dispense brand per patient's insurance at pharmacy discretion., Disp: 100 each, Rfl: 11     blood glucose test (GLUCOSE BLOOD) strips, [BLOOD GLUCOSE TEST (GLUCOSE BLOOD) STRIPS] Test once daily, Disp: , Rfl:      clopidogreL (PLAVIX) 75 mg tablet, [CLOPIDOGREL (PLAVIX) 75 MG TABLET] Take 1 tablet (75 mg total) by mouth daily. Dose to start tomorrow, Disp: 90 tablet, Rfl: 3     compressor, for nebulizer Yogesh, [COMPRESSOR, FOR NEBULIZER YOGESH] To use with albuterol as needed for wheezing and shortness of breath, Disp: 1 Device, Rfl: 0     diphenhydrAMINE (BENADRYL) 25 mg capsule, [DIPHENHYDRAMINE (BENADRYL) 25 MG CAPSULE] Take 25 mg by mouth 2 (two) times a day. With pain medications, Disp: , Rfl:      ezetimibe (ZETIA) 10 mg tablet, [EZETIMIBE (ZETIA) 10 MG TABLET] Take 1 tablet (10 mg total) by mouth at bedtime., Disp: 30 tablet, Rfl: 0     fluticasone (FLONASE) 50 mcg/actuation nasal spray, [FLUTICASONE (FLONASE) 50 MCG/ACTUATION NASAL SPRAY] 2 sprays into each nostril at bedtime. , Disp: , Rfl:      generic lancets  (ACCU-CHEK MULTICLIX LANCET), [GENERIC LANCETS (ACCU-CHEK MULTICLIX LANCET)] TESTING ONCE DAILY, Disp: , Rfl:      glipiZIDE (GLUCOTROL) 5 MG tablet, [GLIPIZIDE (GLUCOTROL) 5 MG TABLET] Take 2.5 mg by mouth 2 (two) times a day before meals., Disp: , Rfl:      ipratropium-albuterol (DUO-NEB) 0.5-2.5 mg/3 mL nebulizer, [IPRATROPIUM-ALBUTEROL (DUO-NEB) 0.5-2.5 MG/3 ML NEBULIZER] Take 3 mL by nebulization 4 (four) times a day. , Disp: , Rfl:      levothyroxine (SYNTHROID, LEVOTHROID) 50 MCG tablet, [LEVOTHYROXINE (SYNTHROID, LEVOTHROID) 50 MCG TABLET] Take 50 mcg by mouth 3 (three) times a day. , Disp: , Rfl:      loratadine (CLARITIN) 10 mg tablet, [LORATADINE (CLARITIN) 10 MG TABLET] Take 10 mg by mouth daily., Disp: , Rfl:      metoprolol tartrate (LOPRESSOR) 25 MG tablet, [METOPROLOL TARTRATE (LOPRESSOR) 25 MG TABLET] Take 0.5 tablets (12.5 mg total) by mouth 2 (two) times a day., Disp: 60 tablet, Rfl: 0     naproxen (NAPROSYN) 500 MG tablet, [NAPROXEN (NAPROSYN) 500 MG TABLET] Take 1 tablet (500 mg total) by mouth 2 (two) times a day with meals., Disp: 30 tablet, Rfl: 0     nitroglycerin (NITROSTAT) 0.4 MG SL tablet, [NITROGLYCERIN (NITROSTAT) 0.4 MG SL TABLET] Place 1 tablet (0.4 mg total) under the tongue every 5 (five) minutes as needed for chest pain., Disp: 30 tablet, Rfl: 0     oxyCODONE-acetaminophen (PERCOCET/ENDOCET) 5-325 mg per tablet, [OXYCODONE-ACETAMINOPHEN (PERCOCET/ENDOCET) 5-325 MG PER TABLET] Take 1-2 tablets by mouth every 4 (four) hours as needed for pain., Disp: 10 tablet, Rfl: 0     PROAIR HFA 90 mcg/actuation inhaler, [PROAIR HFA 90 MCG/ACTUATION INHALER] Inhale 1-2 puffs every 6 (six) hours as needed for wheezing or shortness of breath. , Disp: , Rfl:      simethicone (MYLICON) 80 MG chewable tablet, [SIMETHICONE (MYLICON) 80 MG CHEWABLE TABLET] Chew 160 mg 2 (two) times a day., Disp: , Rfl:      triamcinolone (KENALOG) 0.1 % cream, [TRIAMCINOLONE (KENALOG) 0.1 % CREAM] Apply 1 application  topically 3 (three) times a day. For psoriasis, Disp: , Rfl:     ALLERGIES:  Allergies   Allergen Reactions     Atorvastatin Shortness Of Breath and Swelling     Was previously deleted but on current encounter (6/20/17), patient told me to add as an allergy.      Cefdinir Shortness Of Breath and Itching     Dye [External Allergen Needs Reconciliation - See Comment] Other (See Comments)     Allergy to all dyes.  Cannot take any medications with dyes or color. Causes rapid heartrate, sweating.  Patient unsure of other symptoms but states they are severe.     Ezetimibe Shortness Of Breath and Swelling     Spoke with patient 9/8/20, stated reaction was shoulder pain, and she never experienced shortness of breath from zetia.      Latex Anaphylaxis     Penicillin V Shortness Of Breath and Rash     Cetirizine Unknown     unknown     Chocolate Laxative [Senna] Unknown     Codeine Unknown     Egg Derived [Chicken-Derived Products (Egg)] Unknown     Fenofibrate Unknown     Influenza Virus Vaccines [Influenza Vaccines] Unknown     Iodine Unknown     Ioxaglate Sodium [Ioxaglate] Itching     Latex Unknown     Peanut Oil Unknown     Toradol [Ketorolac] Unknown     Ulcers, takes aspirin daily at home and Aleve as needed       FAMILY HISTORY:  Family History   Problem Relation Age of Onset     Arthritis Mother      Kidney Disease Father      Heart Disease Sister      Colon Cancer Brother      Cancer Maternal Aunt        SOCIAL HISTORY:   Social History     Socioeconomic History     Marital status:      Spouse name: Not on file     Number of children: Not on file     Years of education: Not on file     Highest education level: Not on file   Occupational History     Not on file   Tobacco Use     Smoking status: Former Smoker     Packs/day: 1.00     Years: 30.00     Pack years: 30.00     Smokeless tobacco: Never Used     Tobacco comment: Smoking Cessation Packet provided   Substance and Sexual Activity     Alcohol use: No      Drug use: No     Sexual activity: Never   Other Topics Concern     Not on file   Social History Narrative    Patient presented to the ED with her  with a complaint of pain and cough 01/22/17       Social Determinants of Health     Financial Resource Strain: Not on file   Food Insecurity: Not on file   Transportation Needs: Not on file   Physical Activity: Not on file   Stress: Not on file   Social Connections: Not on file   Intimate Partner Violence: Not on file   Housing Stability: Not on file       PHYSICAL EXAM:    Vitals: BP (!) 143/74   Pulse 72   Temp 97.5  F (36.4  C)   Resp 18   Wt 83.5 kg (184 lb)   SpO2 95%   BMI 32.59 kg/m     Constitutional: Well developed, well nourished. Comfortable appearing.  HENT: Normocephalic, atraumatic, mucous membranes moist, nose normal.   Neck- Supple, gross ROM intact.   Eyes: Pupils mid-range, sclera white, no discharge  Respiratory: Clear to auscultation bilaterally, no respiratory distress, no wheezing, speaks full sentences easily.  Cardiovascular: Normal heart rate, regular rhythm, no murmurs. No lower extremity edema, 2+ DP pulses.   GI: Soft, no tenderness to deep palpation in all quadrants, no masses.  Musculoskeletal: Moving all 4 extremities intentionally and without pain. No obvious deformity.  Skin: Warm, dry, no rash.  Neurologic: Alert & oriented x 3, speech clear, moving all extremities spontaneously   Psychiatric: Affect normal, cooperative.     LAB:  All pertinent labs reviewed and interpreted.  Labs Ordered and Resulted from Time of ED Arrival to Time of ED Departure   COMPREHENSIVE METABOLIC PANEL - Abnormal       Result Value    Sodium 144      Potassium 3.1 (*)     Chloride 106      Carbon Dioxide (CO2) 28      Anion Gap 10      Urea Nitrogen 12      Creatinine 1.04      Calcium 8.7      Glucose 116      Alkaline Phosphatase 125 (*)     AST 14      ALT 12      Protein Total 7.1      Albumin 2.8 (*)     Bilirubin Total 0.4      GFR Estimate  54 (*)    CBC WITH PLATELETS AND DIFFERENTIAL - Abnormal    WBC Count 8.0      RBC Count 4.11      Hemoglobin 11.0 (*)     Hematocrit 34.5 (*)     MCV 84      MCH 26.8      MCHC 31.9      RDW 19.9 (*)     Platelet Count 329      % Neutrophils 76      % Lymphocytes 16      % Monocytes 7      % Eosinophils 1      % Basophils 0      % Immature Granulocytes 0      NRBCs per 100 WBC 0      Absolute Neutrophils 6.0      Absolute Lymphocytes 1.3      Absolute Monocytes 0.6      Absolute Eosinophils 0.1      Absolute Basophils 0.0      Absolute Immature Granulocytes 0.0      Absolute NRBCs 0.0     CRP INFLAMMATION - Abnormal    CRP 14.0 (*)    TROPONIN I - Normal    Troponin I <0.01         RADIOLOGY:  XR Chest Port 1 View   Final Result   IMPRESSION: Sternotomy. Heart size mildly enlarged. Hyperinflation. Lungs are clear and expanded with no significant change. No convincing evidence for pneumonia.          EKG:   December 10, 2021 at 230 18 p.m.  Sinus rhythm with PACs, 74 bpm.  Low voltage QRS.  There are no worrisome ST or T wave findings.  No significant changes found when compared with prior EKG from April 16, 2021.  QRS duration 70 ms.  QTC duration 468 ms.  I have independently reviewed and interpreted the EKG(s) documented above.      I, Sumanth Whitten, am serving as a scribe to document services personally performed by Dr. Pranav Dumont based on my observation and the provider's statements to me. I, Pranav Dumont M.D. attest that Sumanth Whitten is acting in a scribe capacity, has observed my performance of the services and has documented them in accordance with my direction.      Pranav Dumont M.D.  Emergency Medicine  [facility] EMERGENCY ROOM  [address] [id number] [phone]  Dept: [phone]     Pranav Dumont MD  12/11/21 0048

## 2021-12-10 NOTE — ED TRIAGE NOTES
Pt here with known Covid concern for shortness of breath, and nausea. Tested on Tuesday. Not vaccinated for Covid. Pt has Asthma. Worried about dehydration.

## 2021-12-11 LAB
ATRIAL RATE - MUSE: 74 BPM
DIASTOLIC BLOOD PRESSURE - MUSE: NORMAL MMHG
INTERPRETATION ECG - MUSE: NORMAL
P AXIS - MUSE: 43 DEGREES
PR INTERVAL - MUSE: 172 MS
QRS DURATION - MUSE: 70 MS
QT - MUSE: 422 MS
QTC - MUSE: 468 MS
R AXIS - MUSE: 13 DEGREES
SYSTOLIC BLOOD PRESSURE - MUSE: NORMAL MMHG
T AXIS - MUSE: 44 DEGREES
VENTRICULAR RATE- MUSE: 74 BPM

## 2021-12-11 NOTE — DISCHARGE INSTRUCTIONS
MONOCLONAL ANTIBODIES:    You have tested positive for COVID-19 and may be a candidate for treatment with monoclonal antibodies.  Monoclonal antibody treatment is a limited resource and patients must go through a randomization process and, if selected, can then receive infusion of monoclonal antibody.  You can submit your name for consideration of randomization through the [place] [facility] [place] [employer] ([place]NRAP) by going to the website listed below and following enrollment instructions.      [email or web address]    Monoclonal antibody treatment can be used in people 12 years of age and older who weigh at least 88 pounds (40 kg) and:    1. Test positive for COVID-19  2. Are within 10 days of the start of their symptoms.  3. NOT BE HOSPITALIZED      What is monoclonal antibody treatment?    Antibodies are proteins that people's bodies make to fight viruses, such as the virus that causes COVID-19.  Antibodies made in a laboratory act a lot like natural antibodies to limit the amount of virus in your body.  They are called monoclonal antibodies.  Antibodies must be given into a vein by intravenous (IV) infusion.  Antibodies may be administered only in settings where health care providers have immediate access to medications to treat any reactions and where emergency medical systems are available, if needed.  Monoclonal antibody treatment with bamlanivimab and etesevimab or with casirivimab and imdevimab are for people who have tested positive for COVID-19 and have mild to moderate symptoms. These treatments are allowed by the U.S. Food and Drug Administration (FDA) under an Emergency Use Authorization (EUA) while clinical studies continue to look at their usefulness and safety.    What are the benefits?    Clinical trials for monoclonal antibodies against COVID-19 have shown a decrease in hospitalizations and emergency room visits and a decrease in  the amount of virus carried by an infected person. Studies are still ongoing.    What is Emergency Use Authorization?    Drug treatments available through [facility] are authorized by the FDA under an emergency use authorization (EUA), meaning they have not been fully FDA-approved. In an emergency, like a pandemic, it may not be possible to have all the evidence that the FDA would usually have before approving a treatment. In this situation, the FDA can make a judgment to release drug treatments for use before it's fully approved. If there's evidence that strongly suggests that patients have benefited from a treatment, the agency can issue an EUA to make it available. An EUA does not mean that a treatment has not been studied, or that a treatment is experimental. Receiving a drug under an EUA is different than participating in a clinical trial.

## 2021-12-24 ENCOUNTER — TELEPHONE (OUTPATIENT)
Dept: NURSING | Facility: CLINIC | Age: 71
End: 2021-12-24
Payer: MEDICARE

## 2021-12-25 NOTE — TELEPHONE ENCOUNTER
Patient calling with question about:    Potassium dosage.  Patient states that she cannot get in to see her PCP until 12/27/21 and she cannot remember her dosage for potassium because she threw the bottle away.     Patient reports that her pharmacist advised her to call to find out what potassium dosage was so she can help her 'supplement' until her PCP appointment.       Patient is advised of what hospital discharge dosage and instructions were. Patient repeated back dosage/instructions.      Rubina Coombs RN, Nurse Advisor 8:22 PM 12/24/2021    COVID 19 Nurse Triage Plan/Patient Instructions    Please be aware that novel coronavirus (COVID-19) may be circulating in the community. If you develop symptoms such as fever, cough, or SOB or if you have concerns about the presence of another infection including coronavirus (COVID-19), please contact your health care provider or visit [email or web address].     Disposition/Instructions    Home care recommended. Follow home care protocol based instructions.    Thank you for taking steps to prevent the spread of this virus.  o Limit your contact with others.  o Wear a simple mask to cover your cough.  o Wash your hands well and often.    Resources    [facility]: About COVID-19: [email or web address]    CDC: What to Do If You're Sick: www.cdc.gov/coronavirus/2019-ncov/about/steps-when-sick.html    CDC: Ending Home Isolation: www.cdc.gov/coronavirus/2019-ncov/hcp/disposition-in-home-patients.html     CDC: Caring for Someone: www.cdc.gov/coronavirus/2019-ncov/if-you-are-sick/care-for-someone.html     [facility]: Interim Guidance for Hospital Discharge to Home: www.health.[facility].[place]/diseases/coronavirus/hcp/hospdischarge.pdf    [facility] clinical trials (COVID-19 research studies): clinicalaffairs.[facility]/umn-clinical-trials     Below are the COVID-19 hotlines at the [place] Department [facility] ([facility]). Interpreters are available.   o For  health questions: Call 755-455-2219 or 1-239.977.8404 (7 a.m. to 7 p.m.)  o For questions about schools and childcare: Call 111-557-6865 or 1-557.125.7040 (7 a.m. to 7 p.m.)

## 2022-01-18 VITALS
BODY MASS INDEX: 37.61 KG/M2 | WEIGHT: 204.4 LBS | HEIGHT: 62 IN | HEART RATE: 64 BPM | DIASTOLIC BLOOD PRESSURE: 50 MMHG | RESPIRATION RATE: 20 BRPM | SYSTOLIC BLOOD PRESSURE: 122 MMHG

## 2022-01-18 VITALS
RESPIRATION RATE: 20 BRPM | SYSTOLIC BLOOD PRESSURE: 169 MMHG | DIASTOLIC BLOOD PRESSURE: 90 MMHG | HEART RATE: 85 BPM | OXYGEN SATURATION: 95 %

## 2022-01-18 VITALS
HEART RATE: 92 BPM | DIASTOLIC BLOOD PRESSURE: 94 MMHG | RESPIRATION RATE: 18 BRPM | TEMPERATURE: 98.9 F | OXYGEN SATURATION: 95 % | SYSTOLIC BLOOD PRESSURE: 191 MMHG

## 2022-01-18 VITALS
DIASTOLIC BLOOD PRESSURE: 76 MMHG | HEART RATE: 83 BPM | RESPIRATION RATE: 18 BRPM | OXYGEN SATURATION: 94 % | TEMPERATURE: 98.3 F | SYSTOLIC BLOOD PRESSURE: 163 MMHG

## 2022-01-18 VITALS — WEIGHT: 203.1 LBS | BODY MASS INDEX: 37.37 KG/M2 | HEIGHT: 62 IN

## 2022-05-22 ENCOUNTER — OFFICE VISIT (OUTPATIENT)
Dept: FAMILY MEDICINE | Facility: CLINIC | Age: 72
End: 2022-05-22
Payer: MEDICARE

## 2022-05-22 VITALS
OXYGEN SATURATION: 96 % | TEMPERATURE: 98.1 F | HEART RATE: 73 BPM | BODY MASS INDEX: 35.43 KG/M2 | DIASTOLIC BLOOD PRESSURE: 87 MMHG | SYSTOLIC BLOOD PRESSURE: 161 MMHG | RESPIRATION RATE: 18 BRPM | WEIGHT: 200 LBS

## 2022-05-22 DIAGNOSIS — J45.41 MODERATE PERSISTENT ASTHMA WITH EXACERBATION: Primary | ICD-10-CM

## 2022-05-22 PROCEDURE — 99214 OFFICE O/P EST MOD 30 MIN: CPT | Performed by: STUDENT IN AN ORGANIZED HEALTH CARE EDUCATION/TRAINING PROGRAM

## 2022-05-22 RX ORDER — PREDNISONE 20 MG/1
40 TABLET ORAL DAILY
Qty: 10 TABLET | Refills: 0 | Status: SHIPPED | OUTPATIENT
Start: 2022-05-22 | End: 2023-05-27

## 2022-05-22 RX ORDER — PREDNISONE 20 MG/1
20 TABLET ORAL DAILY
Qty: 5 TABLET | Refills: 0 | Status: SHIPPED | OUTPATIENT
Start: 2022-05-22 | End: 2022-05-22

## 2022-05-22 NOTE — PROGRESS NOTES
Assessment & Plan     Moderate persistent asthma with exacerbation  She is otherwise afebrile and hemodynamically stable does not need higher level of care. Patient has moderate persistent asthma, is currently in exacerbation.  She is on appropriate maintenance inhaler, taking antihistamines and intranasal intracortical steroids.  Her medical adherence and current regimen is appropriate.  It continues to be an exacerbation, we will treat with prednisone 5 days.  Follow up with PCP after completing her prescription.  - predniSONE (DELTASONE) 20 MG tablet; Take 2 tablets (40 mg) by mouth daily    Return in about 1 week (around 5/29/2022) for Follow up.    Pavan Moreno MD  [facility]    Claudia Granger is a 71 year old who presents for the following health issues    HPI     Patient Active Problem List   Diagnosis     Coronary artery disease involving native coronary artery of native heart, angina presence unspecified     GERD (gastroesophageal reflux disease)     Obese     UTI (urinary tract infection)     Mixed hyperlipidemia due to type 2 diabetes mellitus (H)     NSTEMI (non-ST elevated myocardial infarction) (H)     S/P CABG x 3     Chest pain     COPD exacerbation (H)     Elevated lipase     Tobacco abuse     Anxiety     Asthma     Chronic back pain     Chronic eczema     Depression, major, recurrent, moderate (H)     Disc degeneration     Dyslipidemia     Essential hypertension     Grief     Hand pain     Hypertriglyceridemia     Hypothyroidism     Insomnia, idiopathic     Osteoarthritis     PTSD (post-traumatic stress disorder)     Type 2 diabetes mellitus (H)     Controlled type 2 diabetes mellitus without complication, without long-term current use of insulin (H)     Vitamin D deficiency     Severe episode of recurrent major depressive disorder, without psychotic features (H)     ACS (acute coronary syndrome) (H)     ST elevation myocardial infarction involving right coronary  artery (H)     Mixed hyperlipidemia     Obesity (BMI 35.0-39.9) with comorbidity (H)     Adjustment disorder with depressed mood     Costochondritis     Dermatitis     Hemiplegia affecting nondominant side (H)     Left hemiparesis (H)     Uncontrolled type 2 diabetes mellitus with hyperglycemia (H)     Patient presents with 1 week history of sinus congestion, runny nose, wheezing, shortness of breath.  She has a history of poorly controlled persistent asthma.  She has been taking albuterol inhalers every 4 hours for the past week.  She is taking her Advair twice a day, cetirizine, Flonase.  She continues to have a productive cough with yellow phlegm.  She has taken multiple courses of prednisone in the past 6 months which usually alleviate her symptoms.    Review of Systems   Constitutional, HEENT, cardiovascular, pulmonary, gi and gu systems are negative, except as otherwise noted.      Objective    BP (!) 161/87   Pulse 73   Temp 98.1  F (36.7  C) (Tympanic)   Resp 18   Wt 90.7 kg (200 lb)   SpO2 96%   BMI 35.43 kg/m    Body mass index is 35.43 kg/m .  Physical Exam   GENERAL: healthy, alert and no distress  EYES: Eyes grossly normal to inspection, PERRL and conjunctivae and sclerae normal  NECK: no adenopathy, no asymmetry, masses, or scars and thyroid normal to palpation  RESP: End expiratory wheezes bilaterally, no crackles  CV: regular rate and rhythm, normal S1 S2, no S3 or S4, no murmur, click or rub, no peripheral edema and peripheral pulses strong  MS: no gross musculoskeletal defects noted, no edema  SKIN: no suspicious lesions or rashes  NEURO: Normal strength and tone, mentation intact and speech normal  PSYCH: mentation appears normal, affect normal/bright    ----- Service Performed and Documented by Resident or Fellow ------

## 2022-06-21 ENCOUNTER — OFFICE VISIT (OUTPATIENT)
Dept: FAMILY MEDICINE | Facility: CLINIC | Age: 72
End: 2022-06-21
Payer: MEDICARE

## 2022-06-21 VITALS
SYSTOLIC BLOOD PRESSURE: 154 MMHG | BODY MASS INDEX: 35.61 KG/M2 | OXYGEN SATURATION: 95 % | DIASTOLIC BLOOD PRESSURE: 83 MMHG | TEMPERATURE: 98.1 F | WEIGHT: 201 LBS | HEART RATE: 75 BPM

## 2022-06-21 DIAGNOSIS — E66.01 MORBID OBESITY (H): ICD-10-CM

## 2022-06-21 DIAGNOSIS — E11.65 UNCONTROLLED TYPE 2 DIABETES MELLITUS WITH HYPERGLYCEMIA (H): ICD-10-CM

## 2022-06-21 DIAGNOSIS — F33.2 SEVERE EPISODE OF RECURRENT MAJOR DEPRESSIVE DISORDER, WITHOUT PSYCHOTIC FEATURES (H): ICD-10-CM

## 2022-06-21 DIAGNOSIS — I42.9 CARDIOMYOPATHY, UNSPECIFIED TYPE (H): ICD-10-CM

## 2022-06-21 DIAGNOSIS — J44.89 ASTHMA-COPD OVERLAP SYNDROME (H): Primary | ICD-10-CM

## 2022-06-21 DIAGNOSIS — G81.94 LEFT HEMIPARESIS (H): ICD-10-CM

## 2022-06-21 PROCEDURE — 99214 OFFICE O/P EST MOD 30 MIN: CPT | Performed by: PHYSICIAN ASSISTANT

## 2022-06-21 RX ORDER — PREDNISONE 20 MG/1
40 TABLET ORAL DAILY
Qty: 10 TABLET | Refills: 0 | Status: SHIPPED | OUTPATIENT
Start: 2022-06-21 | End: 2022-06-26

## 2022-06-21 RX ORDER — FLUTICASONE PROPIONATE 50 MCG
1 SPRAY, SUSPENSION (ML) NASAL DAILY
Qty: 9.9 ML | Refills: 0 | Status: SHIPPED | OUTPATIENT
Start: 2022-06-21 | End: 2022-07-21

## 2022-06-21 RX ORDER — AMLODIPINE BESYLATE 5 MG/1
5 TABLET ORAL DAILY
COMMUNITY
Start: 2021-11-12 | End: 2023-05-27

## 2022-06-21 RX ORDER — OLOPATADINE HYDROCHLORIDE 2 MG/ML
1 SOLUTION/ DROPS OPHTHALMIC DAILY
Qty: 2.5 ML | Refills: 0 | Status: SHIPPED | OUTPATIENT
Start: 2022-06-21 | End: 2022-06-28

## 2022-06-22 NOTE — PATIENT INSTRUCTIONS
Use of Flonase nasal spray to help with itchy watery eyes and postnasal drainage  Pataday eyedrops for itchy watery eyes  Prednisone to help with your asthma and COPD congestion and component of seasonal allergies.

## 2022-06-22 NOTE — PROGRESS NOTES
Patient presents with:  Allergies: Watery itching eyes  and cough difficult to breathe        Clinical Decision Making:  Patient has complicated past medical history for asthma COPD and cardiomyopathy prior history of stroke and type 2 diabetes, patient had difficulty with seasonal allergic rhinitis with itchy watery eyes and postnasal drainage.  Patient has been using Flonase.  Refill of Flonase sent in.  Pataday drops for the itchy watery eyes and a short course of prednisone to help with her COPD exacerbation and postnasal drainage.  Patient will monitor her blood glucose levels and return to primary care provider if shortness of breath or fever or new symptoms or concerns arise or not getting good resolution with her course of treatment.       ICD-10-CM    1. Asthma-COPD overlap syndrome (H)  J44.9 predniSONE (DELTASONE) 20 MG tablet     fluticasone (FLONASE) 50 MCG/ACT nasal spray     olopatadine (PATADAY) 0.2 % ophthalmic solution   2. Cardiomyopathy, unspecified type (H)  I42.9    3. Left hemiparesis (H)  G81.94    4. Severe episode of recurrent major depressive disorder, without psychotic features (H)  F33.2    5. Uncontrolled type 2 diabetes mellitus with hyperglycemia (H)  E11.65    6. Morbid obesity (H)  E66.01        Patient Instructions   Use of Flonase nasal spray to help with itchy watery eyes and postnasal drainage  Pataday eyedrops for itchy watery eyes  Prednisone to help with your asthma and COPD congestion and component of seasonal allergies.          HPI:  Bárbara Perez is a 71 year old female who has a complicated past medical history for type 2 diabetes and cardiomyopathy, asthma allergy, hemiparesis, and obesity with a chief complaint of having COPD exacerbation and allergic rhinitis.  Patient shares that she has difficulty with seasonal allergic rhinitis and has had itchy watery eyes and postnasal drainage.  Patient also has a history of COPD and asthma and is a current intermittent  smoker.  Has had difficulty with itchy watery eyes and is not getting good improvement with her Flonase.  Patient is requesting treatment for her allergies with primarily the itchy watery eyes and allergic rhinitis.  He has not had difficulty with shortness of breath or dyspnea on exertion and no chest pain diaphoresis nausea or vomiting.    History obtained from chart review and the patient.    Problem List:  2022-06: Cardiomyopathy, unspecified type (H)  2021-06: Obesity (BMI 35.0-39.9) with comorbidity (H)  2021-01: Costochondritis  2021-01: Hemiplegia affecting nondominant side (H)  2021-01: Left hemiparesis (H)  2020-09: Disc degeneration  2020-06: Chronic eczema  2020-06: Insomnia, idiopathic  2020-06: Controlled type 2 diabetes mellitus without complication,   without long-term current use of insulin (H)  2020-06: Dermatitis  2020-06: Uncontrolled type 2 diabetes mellitus with hyperglycemia (H)  2017-06: Chest pain  2017-04: Depression, major, recurrent, moderate (H)  2017-01: Mixed hyperlipidemia due to type 2 diabetes mellitus (H)  2017-01: UTI (urinary tract infection)  2016-02: Grief  2016-02: Adjustment disorder with depressed mood  2016-02: Severe episode of recurrent major depressive disorder,   without psychotic features (H)  2015-12: Anxiety  2015-04: Obese  2014-08: Coronary artery disease involving native coronary artery of   native heart, angina presence unspecified  2014-08: GERD (gastroesophageal reflux disease)  2012-12: Hand pain  2012-04: PTSD (post-traumatic stress disorder)  2010-04: Dyslipidemia  2010-04: Essential hypertension  2010-04: Osteoarthritis  2010-04: Type 2 diabetes mellitus (H)  2010-04: Vitamin D deficiency  2010-04: Chronic back pain  2010-04: Hypertriglyceridemia  2008-07: Asthma  2008-07: Hypothyroidism  NSTEMI (non-ST elevated myocardial infarction) (H)  S/P CABG x 3  COPD exacerbation (H)  Elevated lipase  Tobacco abuse  ACS (acute coronary syndrome) (H)  ST elevation  myocardial infarction involving right coronary artery (H)  Mixed hyperlipidemia      Past Medical History:   Diagnosis Date     Asthma      CAD (coronary artery disease)      Chest pain      COPD (chronic obstructive pulmonary disease) (H)      Diabetes mellitus (H)      GERD (gastroesophageal reflux disease)      Heart attack (H)      HTN (hypertension)      Hypothyroidism      Psoriasis      Seasonal allergies      Sternum pain     Following Cardiac Bypass       Social History     Tobacco Use     Smoking status: Former Smoker     Packs/day: 1.00     Years: 30.00     Pack years: 30.00     Smokeless tobacco: Never Used     Tobacco comment: Smoking Cessation Packet provided   Substance Use Topics     Alcohol use: No       Review of Systems  As above in HPI otherwise negative.    Vitals:    06/21/22 2004 06/21/22 2006   BP: (!) 144/86 (!) 154/83   BP Location: Right arm    Patient Position: Sitting    Cuff Size: Adult Large    Pulse: 75    Temp: 98.1  F (36.7  C)    TempSrc: Tympanic    SpO2: 95%    Weight: 91.2 kg (201 lb)        General: Patient is resting comfortably no acute distress is afebrile  HEENT: Head is normocephalic atraumatic   eyes are PERRL EOMI sclera anicteric conjunctiva are with chemosis but no noted cobblestoning  TMs are clear bilaterally  Throat is without mild pharyngeal wall erythema and drainage down the posterior pharyngeal wall  No cervical lymphadenopathy present  LUNGS: With prolonged expiratory wheezes in the bilateral lung fields.  HEART: Regular rate and rhythm  Skin: Without rash non-diaphoretic    Physical Exam    At the end of the encounter, I discussed results, diagnosis, medications. Discussed red flags for immediate return to clinic/ER, as well as indications for follow up if no improvement. Patient understood and agreed to plan. Patient was stable for discharge.

## 2022-08-02 NOTE — PROGRESS NOTES
Noted   Progress Notes by Cesar Stephens PA-C at 6/23/2021  6:15 PM     Author: Cesar Stephens PA-C Service: -- Author Type: Physician Assistant    Filed: 6/23/2021  7:14 PM Encounter Date: 6/23/2021 Status: Addendum    : Cesar Stephens PA-C (Physician Assistant)    Related Notes: Original Note by Cesar Stephens PA-C (Physician Assistant) filed at 6/23/2021  7:13 PM       Chief Complaint   Patient presents with   ? Urinary Frequency     x2-3d, urinary urgency, low back pain         Clinical Decision Making:  Patient has multiple drug allergies which were reviewed for her treatment today.  She has tolerated sulfa in the past.  Because of her age she will have a 10-day course of antibiotic.  She confirms that sulfa has been very good for her in the past.  Expected course of resolution and indication for return was gone over questions were answered patient satisfaction before discharge.    At the end of the encounter, I discussed results, diagnosis, medications. Discussed red flags for immediate return to clinic/ER, as well as indications for follow up if no improvement. Patient understood and agreed to plan. Patient was stable for discharge.    1. Urinary tract infection without hematuria, site unspecified  sulfamethoxazole-trimethoprim (BACTRIM DS) 800-160 mg per tablet    DISCONTINUED: ciprofloxacin HCl (CIPRO) 500 MG tablet   2. Urinary frequency  Urinalysis-UC if Indicated    Culture, Urine   3. Morbid obesity (H)           Patient Instructions     Increased fluids and rest.  Discussed signs and symptoms of ascending urinary tract infection symptoms to include pyelonephritis. Instructed to turn to clinic if there are increased fever chills night sweats fatigue abdominal pain or flank pain  Antibiotic as written. Risks and benefits of medication discussed.  Indication for return to clinic.      As a result of our visit today, here are the action plans for you:    1. Medication(s) to stop: There are no discontinued  medications.    2. Medication(s) to start or change:   Medications Ordered   Medications   ? ciprofloxacin HCl (CIPRO) 500 MG tablet     Sig: Take 1 tablet (500 mg total) by mouth 2 (two) times a day for 10 days.     Dispense:  20 tablet     Refill:  0       3. Other instructions: Yes      Urinary Tract Infections in Women    Urinary tract infections (UTIs) are most often caused by bacteria (germs). These bacteria enter the urinary tract. The bacteria may come from outside the body. Or they may travel from the skin outside the rectum or vagina into the urethra. Female anatomy makes it easier for bacteria from the bowel to enter a womans urinary tract, which is the most common source of UTI. This means women develop UTIs more often than men. Pain in or around the urinary tract is a common UTI symptom. But the only way to know for sure if you have a UTI for the health care provider to test your urine. The two tests that may be done are the urinalysis and urine culture.  Types of UTIs    Cystitis: A bladder infection (cystitis) is the most common UTI in women. You may have urgent or frequent urination. You may also have pain, burning when you urinate, and bloody urine.    Urethritis: This is an inflamed urethra, which is the tube that carries urine from the bladder to outside the body. You may have lower stomach or back pain. You may also have urgent or frequent urination.    Pyelonephritis: This is a kidney infection. If not treated, it can be serious and damage your kidneys. In severe cases, you may be hospitalized. You may have a fever and lower back pain.  Medications to treat a UTI  Most UTIs are treated with antibiotics. These kill the bacteria. The length of time you need to take them depends on the type of infection. It may be as short as 3 days. If you have repeated UTIs, a low-dose antibiotic may be needed for several months. Take antibiotics exactly as directed. Dont stop taking them until all of the  medication is gone. If you stop taking the antibiotic too soon, the infection may not go away, and you may develop a resistance to the antibiotic. This can make it much harder to treat.  Lifestyle changes to treat and prevent UTIs  The lifestyle changes below will help get rid of your UTI. They may also help prevent future UTIs.    Drink plenty of fluids. This includes water, juice, or other caffeine-free drinks. Fluids help flush bacteria out of your body.    Empty your bladder. Always empty your bladder when you feel the urge to urinate. And always urinate before going to sleep. Urine that stays in your bladder can lead to infection. Try to urinate before and after sex as well.    Practice good personal hygiene. Wipe yourself from front to back after using the toilet. This helps keep bacteria from getting into the urethra.    Use condoms during sex. These help prevent UTIs caused by sexually transmitted bacteria. Also, avoid using spermicides during sex. These can increase the risk of UTIs. Choose other forms of birth control instead. For women who tend to get UTIs after sex, a low-dose of a preventive antibiotic may be used. Be sure to discuss this option with your health care provider.    Follow up with your health care provider as directed. He or she may test to make sure the infection has cleared. If necessary, additional treatment may be started.  Date Last Reviewed: 9/8/2014 2000-2016 [employer]. [address] [place], [place] [address]. All rights reserved. This information is not intended as a substitute for professional medical care. Always follow your healthcare professional's instructions.                         HPI:  Bárbara Perez is a 70 y.o. female who presents today Day history of irritative voiding symptoms to include a three day history of urinary hesitancy urgency frequency and dysuria.  Patient denies gross hematuria.  Denies fever chills night sweats fatigue or other red  flag symptoms to include back or flank pain and no vaginal discharge.  She has had a previous urinary tract infections and it does feel similar to how her other symptoms have been.    History obtained from chart review and the patient.    Problem List:  2021-06: Obesity (BMI 35.0-39.9) with comorbidity (H)  2020-09: Disc degeneration  2020-06: Chronic eczema  2020-06: Insomnia, idiopathic  2020-06: Controlled type 2 diabetes mellitus without complication,   without long-term current use of insulin (H)  2017-06: Chest pain  2017-04: Depression, major, recurrent, moderate (H)  2017-01: Mixed hyperlipidemia due to type 2 diabetes mellitus (H)  2017-01: UTI (urinary tract infection)  2016-02: Grief  2016-02: Severe episode of recurrent major depressive disorder,   without psychotic features (H)  2015-12: Anxiety  2015-04: Obese  2014-08: Coronary artery disease involving native coronary artery of   native heart, angina presence unspecified  2014-08: GERD (gastroesophageal reflux disease)  2014-08: Chest pain  2012-12: Hand pain  2012-04: PTSD (post-traumatic stress disorder)  2010-04: Dyslipidemia  2010-04: Essential hypertension  2010-04: Osteoarthritis  2010-04: Type 2 diabetes mellitus (H)  2010-04: Vitamin D deficiency  2010-04: Chronic back pain  2010-04: Hypertriglyceridemia  2008-07: Asthma  2008-07: Hypothyroidism  NSTEMI (non-ST elevated myocardial infarction) (H)  S/P CABG x 3  COPD exacerbation (H)  Elevated lipase  Tobacco abuse  ACS (acute coronary syndrome) (H)  ST elevation myocardial infarction involving right coronary artery (H)  Mixed hyperlipidemia      Past Medical History:   Diagnosis Date   ? Asthma    ? CAD (coronary artery disease)    ? Chest pain    ? COPD (chronic obstructive pulmonary disease) (H)    ? Diabetes mellitus (H)    ? GERD (gastroesophageal reflux disease)    ? Heart attack (H)    ? HTN (hypertension)    ? Hypothyroidism    ? Psoriasis    ? Seasonal allergies    ? Sternum pain      Following Cardiac Bypass       Social History     Tobacco Use   ? Smoking status: Former Smoker     Packs/day: 1.00     Years: 30.00     Pack years: 30.00   ? Smokeless tobacco: Never Used   ? Tobacco comment: Smoking Cessation Packet provided   Substance Use Topics   ? Alcohol use: No       Review of Systems  As above in HPI, otherwise balance of Review of Systems are negative.    Vitals:    06/23/21 1813 06/23/21 1901   BP: (!) 188/91 169/90   Pulse: 85    Resp: 20    SpO2: 95%        Physical Exam    General: Patient is resting comfortably no acute distress is afebrile  HEENT: Head is normocephalic atraumatic   eyes are PERRL EOMI sclera anicteric   TMs are clear bilaterally  Throat is clear  No cervical lymphadenopathy present  LUNGS: Clear to auscultation bilaterally  HEART: Regular rate and rhythm  Abdomen: Nontender nondistended no rebound or guarding no masses no suprapubic tenderness to palpation.  No CVA tenderness to percussion.  Skin: Without rash non-diaphoretic    Labs:  Recent Results (from the past 72 hour(s))   Urinalysis-UC if Indicated   Result Value Ref Range    Color, UA Yellow Colorless, Yellow, Straw, Light Yellow    Clarity, UA Clear Clear    Glucose, UA Negative Negative    Protein, UA Negative Negative    Bilirubin, UA Negative Negative    Urobilinogen, UA 0.2 E.U./dL 0.2 E.U./dL, 1.0 E.U./dL    pH, UA 5.5 5.0 - 8.0    Blood, UA Negative Negative    Ketones, UA Negative Negative    Nitrite, UA Negative Negative    Leukocytes, UA Small (!) Negative    Specific Gravity, UA <=1.005 1.005 - 1.030    RBC, UA None Seen None Seen, 0-2 hpf    WBC, UA 5-10 (!) None Seen, 0-5 hpf    Bacteria, UA Few (!) None Seen    Squam Epithel, UA 0-5 None Seen, 0-5 lpf

## 2022-09-25 ENCOUNTER — HOSPITAL ENCOUNTER (OUTPATIENT)
Dept: GENERAL RADIOLOGY | Facility: HOSPITAL | Age: 72
Discharge: HOME | End: 2022-09-25
Attending: PHYSICIAN ASSISTANT | Admitting: PHYSICIAN ASSISTANT
Payer: MEDICARE

## 2022-09-25 ENCOUNTER — OFFICE VISIT (OUTPATIENT)
Dept: FAMILY MEDICINE | Facility: CLINIC | Age: 72
End: 2022-09-25
Payer: MEDICARE

## 2022-09-25 VITALS
HEART RATE: 73 BPM | DIASTOLIC BLOOD PRESSURE: 74 MMHG | OXYGEN SATURATION: 92 % | TEMPERATURE: 98.7 F | SYSTOLIC BLOOD PRESSURE: 167 MMHG

## 2022-09-25 DIAGNOSIS — R05.9 COUGH: ICD-10-CM

## 2022-09-25 DIAGNOSIS — E11.9 TYPE 2 DIABETES MELLITUS WITHOUT COMPLICATION, UNSPECIFIED WHETHER LONG TERM INSULIN USE (H): ICD-10-CM

## 2022-09-25 DIAGNOSIS — R30.0 DYSURIA: Primary | ICD-10-CM

## 2022-09-25 DIAGNOSIS — R10.9 FLANK PAIN: ICD-10-CM

## 2022-09-25 LAB
ALBUMIN UR-MCNC: NEGATIVE MG/DL
APPEARANCE UR: CLEAR
BACTERIA #/AREA URNS HPF: ABNORMAL /HPF
BILIRUB UR QL STRIP: NEGATIVE
CLUE CELLS: ABNORMAL
COLOR UR AUTO: YELLOW
GLUCOSE UR STRIP-MCNC: NEGATIVE MG/DL
HGB UR QL STRIP: NEGATIVE
KETONES UR STRIP-MCNC: NEGATIVE MG/DL
LEUKOCYTE ESTERASE UR QL STRIP: ABNORMAL
NITRATE UR QL: NEGATIVE
PH UR STRIP: 5.5 [PH] (ref 5–8)
RBC #/AREA URNS AUTO: ABNORMAL /HPF
SP GR UR STRIP: 1.01 (ref 1–1.03)
SQUAMOUS #/AREA URNS AUTO: ABNORMAL /LPF
TRICHOMONAS, WET PREP: ABNORMAL
UROBILINOGEN UR STRIP-ACNC: 0.2 E.U./DL
WBC #/AREA URNS AUTO: ABNORMAL /HPF
WBC'S/HIGH POWER FIELD, WET PREP: ABNORMAL
YEAST, WET PREP: ABNORMAL

## 2022-09-25 PROCEDURE — 87210 SMEAR WET MOUNT SALINE/INK: CPT | Performed by: PHYSICIAN ASSISTANT

## 2022-09-25 PROCEDURE — U0005 INFEC AGEN DETEC AMPLI PROBE: HCPCS | Performed by: PHYSICIAN ASSISTANT

## 2022-09-25 PROCEDURE — 87086 URINE CULTURE/COLONY COUNT: CPT | Performed by: PHYSICIAN ASSISTANT

## 2022-09-25 PROCEDURE — 81001 URINALYSIS AUTO W/SCOPE: CPT | Performed by: PHYSICIAN ASSISTANT

## 2022-09-25 PROCEDURE — 99214 OFFICE O/P EST MOD 30 MIN: CPT | Mod: CS | Performed by: PHYSICIAN ASSISTANT

## 2022-09-25 PROCEDURE — 71046 X-RAY EXAM CHEST 2 VIEWS: CPT | Mod: FY

## 2022-09-25 PROCEDURE — U0003 INFECTIOUS AGENT DETECTION BY NUCLEIC ACID (DNA OR RNA); SEVERE ACUTE RESPIRATORY SYNDROME CORONAVIRUS 2 (SARS-COV-2) (CORONAVIRUS DISEASE [COVID-19]), AMPLIFIED PROBE TECHNIQUE, MAKING USE OF HIGH THROUGHPUT TECHNOLOGIES AS DESCRIBED BY CMS-2020-01-R: HCPCS | Performed by: PHYSICIAN ASSISTANT

## 2022-09-25 RX ORDER — TIZANIDINE 2 MG/1
TABLET ORAL
COMMUNITY
Start: 2022-07-27

## 2022-09-25 ASSESSMENT — ENCOUNTER SYMPTOMS
COUGH: 1
CHILLS: 0
DYSURIA: 1
SINUS PRESSURE: 1
NAUSEA: 0
HEMATURIA: 0
ABDOMINAL PAIN: 0
FLANK PAIN: 1
FREQUENCY: 1
VOMITING: 0
FEVER: 0

## 2022-09-25 NOTE — PATIENT INSTRUCTIONS
Your urine test is not indicative of urinary tract infection.  Your wet prep was negative for yeast, bacterial vaginosis, and BV.  Your chest x-ray is negative for any signs pneumonia.  You will only be notified of your COVID test results if they are positive.  This test usually takes between 1 to 2 days.  I recommend continuing to take your albuterol inhaler, cetirizine, Flonase.  Try to minimize the amount of Sudafed you take due to your history of high blood pressure.

## 2022-09-25 NOTE — PROGRESS NOTES
Patient presents with:  Urinary Problem: Urinary frequency, burning. Ongoing x3-4 days.   Note: : Patient is taking Sudafed for head cold.       Clinical Decision Making:  Patient experiencing irritative voiding symptoms and she wants to make sure that she does not have a UTI because she has an upcoming cataract surgery.  Her UA is not strongly indicative of UTI, but I did culture the sample to be sure.  Wet prep was also negative    Patient also experiencing some nasal congestion symptoms and asthma symptoms.  Chest x-ray is negative for any signs of pneumonia.  Recommend COVID testing, which patient was originally refusing, but then changed her mind.  Patient is taking a myriad of over-the-counter cough preparations.  We discussed which ones are safe for blood pressure.      ICD-10-CM    1. Dysuria  R30.0 UA with Microscopic reflex to Culture - Clinic Collect     Urine Microscopic     Wet prep - Clinic Collect     Urine Culture Aerobic Bacterial     Urine Culture Aerobic Bacterial   2. Flank pain  R10.9 XR Chest 2 Views   3. Cough  R05.9 Symptomatic; Unknown COVID-19 Virus (Coronavirus) by PCR Nose   4. Type 2 diabetes mellitus without complication, unspecified whether long term insulin use (H)  E11.9        Patient Instructions   1. Your urine test is not indicative of urinary tract infection.  2. Your wet prep was negative for yeast, bacterial vaginosis, and BV.  3. Your chest x-ray is negative for any signs pneumonia.  4. You will only be notified of your COVID test results if they are positive.  This test usually takes between 1 to 2 days.  5. I recommend continuing to take your albuterol inhaler, cetirizine, Flonase.  Try to minimize the amount of Sudafed you take due to your history of high blood pressure.      HPI:  Bárbara Perez is a 72 year old female with PMHx DM2, who presents today complaining of burning dysuria and urinary frequency for the past 3 to 4 days.     Patient has been having sinus  congestion. She has been using Benadryl at night. She is also taking Sudafed and and over the counter cough syrup.     History obtained from the patient.    Problem List:  2022-06: Cardiomyopathy, unspecified type (H)  2021-06: Obesity (BMI 35.0-39.9) with comorbidity (H)  2021-01: Costochondritis  2021-01: Hemiplegia affecting nondominant side (H)  2021-01: Left hemiparesis (H)  2020-09: Disc degeneration  2020-06: Chronic eczema  2020-06: Insomnia, idiopathic  2020-06: Controlled type 2 diabetes mellitus without complication,   without long-term current use of insulin (H)  2020-06: Dermatitis  2020-06: Uncontrolled type 2 diabetes mellitus with hyperglycemia (H)  2017-06: Chest pain  2017-04: Depression, major, recurrent, moderate (H)  2017-01: Mixed hyperlipidemia due to type 2 diabetes mellitus (H)  2017-01: UTI (urinary tract infection)  2016-02: Grief  2016-02: Adjustment disorder with depressed mood  2016-02: Severe episode of recurrent major depressive disorder,   without psychotic features (H)  2015-12: Anxiety  2015-04: Obese  2014-08: Coronary artery disease involving native coronary artery of   native heart, angina presence unspecified  2014-08: GERD (gastroesophageal reflux disease)  2012-12: Hand pain  2012-04: PTSD (post-traumatic stress disorder)  2010-04: Dyslipidemia  2010-04: Essential hypertension  2010-04: Osteoarthritis  2010-04: Type 2 diabetes mellitus (H)  2010-04: Vitamin D deficiency  2010-04: Chronic back pain  2010-04: Hypertriglyceridemia  2008-07: Asthma  2008-07: Hypothyroidism  NSTEMI (non-ST elevated myocardial infarction) (H)  S/P CABG x 3  COPD exacerbation (H)  Elevated lipase  Tobacco abuse  ACS (acute coronary syndrome) (H)  ST elevation myocardial infarction involving right coronary artery (H)  Mixed hyperlipidemia      Past Medical History:   Diagnosis Date     Asthma      CAD (coronary artery disease)      Chest pain      COPD (chronic obstructive pulmonary disease) (H)       Diabetes mellitus (H)      GERD (gastroesophageal reflux disease)      Heart attack (H)      HTN (hypertension)      Hypothyroidism      Psoriasis      Seasonal allergies      Sternum pain     Following Cardiac Bypass       Social History     Tobacco Use     Smoking status: Current Some Day Smoker     Packs/day: 1.00     Years: 30.00     Pack years: 30.00     Smokeless tobacco: Never Used     Tobacco comment: Smoking Cessation Packet provided   Substance Use Topics     Alcohol use: No       Review of Systems   Constitutional: Negative for chills and fever.   HENT: Positive for congestion and sinus pressure.    Respiratory: Positive for cough.    Gastrointestinal: Negative for abdominal pain, nausea and vomiting.   Genitourinary: Positive for dysuria, flank pain (mild) and frequency. Negative for hematuria, vaginal discharge and vaginal pain.   Allergic/Immunologic: Positive for environmental allergies.       Vitals:    09/25/22 1604   BP: (!) 167/74   BP Location: Right arm   Patient Position: Sitting   Cuff Size: Adult Regular   Pulse: 73   Temp: 98.7  F (37.1  C)   TempSrc: Tympanic   SpO2: 92%       Physical Exam  Vitals and nursing note reviewed.   Constitutional:       General: She is not in acute distress.     Appearance: She is not ill-appearing, toxic-appearing or diaphoretic.   HENT:      Head: Normocephalic and atraumatic.      Right Ear: External ear normal.      Left Ear: External ear normal.   Eyes:      Conjunctiva/sclera: Conjunctivae normal.   Cardiovascular:      Rate and Rhythm: Normal rate and regular rhythm.      Heart sounds: No murmur heard.  Pulmonary:      Effort: Pulmonary effort is normal. No respiratory distress.      Breath sounds: No stridor. Wheezing present. No rhonchi or rales.   Chest:      Chest wall: No tenderness.   Abdominal:      General: Abdomen is flat. There is no distension.      Tenderness: There is no abdominal tenderness. There is no right CVA tenderness, left CVA  tenderness or guarding.   Neurological:      Mental Status: She is alert.   Psychiatric:         Mood and Affect: Mood normal.         Behavior: Behavior normal.         Thought Content: Thought content normal.         Judgment: Judgment normal.         Results:  Results for orders placed or performed during the hospital encounter of 09/25/22   XR Chest 2 Views     Status: None    Narrative    EXAM: XR CHEST 2 VIEWS  LOCATION: [facility]  DATE/TIME: 9/25/2022 4:54 PM    INDICATION: Cough. Left Sided Flank Pain. Crackles on Lung Exam.  COMPARISON: 12/10/2021      Impression    IMPRESSION: Postoperative cardiomediastinal silhouette is unremarkable. Normal vasculature. Lungs and pleural spaces are clear.   Results for orders placed or performed in visit on 09/25/22   UA with Microscopic reflex to Culture - Clinic Collect     Status: Abnormal    Specimen: Urine, Clean Catch   Result Value Ref Range    Color Urine Yellow Colorless, Straw, Light Yellow, Yellow    Appearance Urine Clear Clear    Glucose Urine Negative Negative mg/dL    Bilirubin Urine Negative Negative    Ketones Urine Negative Negative mg/dL    Specific Gravity Urine 1.010 1.005 - 1.030    Blood Urine Negative Negative    pH Urine 5.5 5.0 - 8.0    Protein Albumin Urine Negative Negative mg/dL    Urobilinogen Urine 0.2 0.2, 1.0 E.U./dL    Nitrite Urine Negative Negative    Leukocyte Esterase Urine Trace (A) Negative   Urine Microscopic     Status: Abnormal   Result Value Ref Range    Bacteria Urine Few (A) None Seen /HPF    RBC Urine 0-2 0-2 /HPF /HPF    WBC Urine 5-10 (A) 0-5 /HPF /HPF    Squamous Epithelials Urine Moderate (A) None Seen /LPF    Narrative    Urine Culture not indicated   Wet prep - Clinic Collect     Status: Abnormal    Specimen: Vagina; Swab   Result Value Ref Range    Trichomonas Absent Absent    Yeast Absent Absent    Clue Cells Absent Absent    WBCs/high power field 1+ (A) None         At the end of the  encounter, I discussed results, diagnosis, medications. Discussed red flags for immediate return to clinic/ER, as well as indications for follow up if no improvement. Patient understood and agreed to plan. Patient was stable for discharge.

## 2022-09-26 LAB — SARS-COV-2 RNA RESP QL NAA+PROBE: NEGATIVE

## 2022-09-27 LAB — BACTERIA UR CULT: NORMAL

## 2023-05-26 ENCOUNTER — OFFICE VISIT (OUTPATIENT)
Dept: FAMILY MEDICINE | Facility: CLINIC | Age: 73
End: 2023-05-26
Payer: MEDICARE

## 2023-05-26 VITALS
DIASTOLIC BLOOD PRESSURE: 80 MMHG | HEART RATE: 77 BPM | TEMPERATURE: 98.7 F | OXYGEN SATURATION: 92 % | WEIGHT: 210.7 LBS | BODY MASS INDEX: 37.32 KG/M2 | RESPIRATION RATE: 22 BRPM | SYSTOLIC BLOOD PRESSURE: 157 MMHG

## 2023-05-26 DIAGNOSIS — J44.1 COPD EXACERBATION (H): Primary | ICD-10-CM

## 2023-05-26 PROCEDURE — 99214 OFFICE O/P EST MOD 30 MIN: CPT | Performed by: PHYSICIAN ASSISTANT

## 2023-05-26 RX ORDER — DOXYCYCLINE 100 MG/1
100 CAPSULE ORAL 2 TIMES DAILY
Qty: 14 CAPSULE | Refills: 0 | Status: SHIPPED | OUTPATIENT
Start: 2023-05-26 | End: 2023-05-27

## 2023-05-26 RX ORDER — IPRATROPIUM BROMIDE AND ALBUTEROL SULFATE 2.5; .5 MG/3ML; MG/3ML
1 SOLUTION RESPIRATORY (INHALATION) EVERY 6 HOURS PRN
Qty: 90 ML | Refills: 0 | Status: SHIPPED | OUTPATIENT
Start: 2023-05-26

## 2023-05-26 NOTE — PROGRESS NOTES
Chief Complaint   Patient presents with     Asthma       ASSESSMENT/PLAN:  Bárbara was seen today for asthma.    Diagnoses and all orders for this visit:    COPD exacerbation (H)  -     doxycycline hyclate (VIBRAMYCIN) 100 MG capsule; Take 1 capsule (100 mg) by mouth 2 times daily for 7 days  -     ipratropium - albuterol 0.5 mg/2.5 mg/3 mL (DUONEB) 0.5-2.5 (3) MG/3ML neb solution; Take 1 vial (3 mLs) by nebulization every 6 hours as needed for shortness of breath, wheezing or cough    Patient's exam and history are consistent with COPD exacerbation.  Is on prednisone which is improving symptoms already but still having chest congestion, mucopurulent cough and oxygen is 92%.  Continue prednisone, refilling DuoNebs and do these 3 times a day for the next several days and starting on doxycycline.  Discussed if she has any new or worsening symptoms she needs to go to the emergency room    Rodríguez Fernandez PA-C      SUBJECTIVE:  Bárbara is a 72 year old female who presents to urgent care with about a week ago with wheezing, shortness of breath, chest tightness and productive cough that is green.  She is wondering if the green mucus is from the green tea she is drinking.  She was placed on prednisone and is on day 3 of this.  She states it is helping with the shortness of breath but still has the wheezing and chest congestion.  She says she  says she is out of her nebulizer solution which usually helps clear up her chest wheezing.  Feels a little bit more tired than usual    ROS: Pertinent ROS neg other than the symptoms noted above in the HPI.     OBJECTIVE:  BP (!) 157/80   Pulse 77   Temp 98.7  F (37.1  C) (Oral)   Resp 22   Wt 95.6 kg (210 lb 11.2 oz)   SpO2 92%   BMI 37.32 kg/m     GENERAL: healthy, alert and no distress  EYES: Eyes grossly normal to inspection  RESP: Wheeze heard in all lung fields, no crackles, productive cough  CV: regular rate and rhythm, normal S1 S2, no S3 or S4, no murmur, click or  rub,    DIAGNOSTICS    No results found for any visits on 05/26/23.     Current Outpatient Medications   Medication     acetaminophen (TYLENOL) 500 MG tablet     ADVAIR -21 mcg/actuation inhaler     albuterol (PROVENTIL) 5 mg/mL nebulizer solution     amLODIPine (NORVASC) 5 MG tablet     aspirin (ASA) 81 MG EC tablet     aspirin 81 mg chewable tablet     blood glucose test (ACCU-CHEK ACTIVE TEST) strips     blood glucose test (GLUCOSE BLOOD) strips     clopidogreL (PLAVIX) 75 mg tablet     compressor, for nebulizer Mellissa     diphenhydrAMINE (BENADRYL) 25 mg capsule     ezetimibe (ZETIA) 10 mg tablet     fluticasone (FLONASE) 50 mcg/actuation nasal spray     generic lancets (ACCU-CHEK MULTICLIX LANCET)     glipiZIDE (GLUCOTROL) 5 MG tablet     ipratropium-albuterol (DUO-NEB) 0.5-2.5 mg/3 mL nebulizer     levothyroxine (SYNTHROID, LEVOTHROID) 50 MCG tablet     loratadine (CLARITIN) 10 mg tablet     metoprolol tartrate (LOPRESSOR) 25 MG tablet     naproxen (NAPROSYN) 500 MG tablet     nitroglycerin (NITROSTAT) 0.4 MG SL tablet     oxyCODONE-acetaminophen (PERCOCET/ENDOCET) 5-325 mg per tablet     predniSONE (DELTASONE) 20 MG tablet     PROAIR HFA 90 mcg/actuation inhaler     simethicone (MYLICON) 80 MG chewable tablet     tiZANidine (ZANAFLEX) 2 MG tablet     triamcinolone (KENALOG) 0.1 % cream     No current facility-administered medications for this visit.      Patient Active Problem List   Diagnosis     Coronary artery disease involving native coronary artery of native heart, angina presence unspecified     GERD (gastroesophageal reflux disease)     Obese     UTI (urinary tract infection)     Mixed hyperlipidemia due to type 2 diabetes mellitus (H)     NSTEMI (non-ST elevated myocardial infarction) (H)     S/P CABG x 3     Chest pain     COPD exacerbation (H)     Elevated lipase     Tobacco abuse     Anxiety     Asthma     Chronic back pain     Chronic eczema     Depression, major, recurrent, moderate (H)      Disc degeneration     Dyslipidemia     Essential hypertension     Grief     Hand pain     Hypertriglyceridemia     Hypothyroidism     Insomnia, idiopathic     Osteoarthritis     PTSD (post-traumatic stress disorder)     Type 2 diabetes mellitus (H)     Controlled type 2 diabetes mellitus without complication, without long-term current use of insulin (H)     Vitamin D deficiency     Severe episode of recurrent major depressive disorder, without psychotic features (H)     ACS (acute coronary syndrome) (H)     ST elevation myocardial infarction involving right coronary artery (H)     Mixed hyperlipidemia     Obesity (BMI 35.0-39.9) with comorbidity (H)     Adjustment disorder with depressed mood     Costochondritis     Dermatitis     Hemiplegia affecting nondominant side (H)     Left hemiparesis (H)     Uncontrolled type 2 diabetes mellitus with hyperglycemia (H)     Cardiomyopathy, unspecified type (H)      Past Medical History:   Diagnosis Date     Asthma      CAD (coronary artery disease)      Chest pain      COPD (chronic obstructive pulmonary disease) (H)      Diabetes mellitus (H)      GERD (gastroesophageal reflux disease)      Heart attack (H)      HTN (hypertension)      Hypothyroidism      Psoriasis      Seasonal allergies      Sternum pain     Following Cardiac Bypass     Past Surgical History:   Procedure Laterality Date     APPENDECTOMY       CARDIAC CATHETERIZATION  01/26/2017     CARDIAC CATHETERIZATION N/A 1/26/2017    Procedure: Coronary Angiogram;  Surgeon: Arsen Anderson MD;  Location: [facility];  Service:      CARDIAC SURGERY  2008    Triple Bypass     CV CORONARY ANGIOGRAM N/A 9/7/2020    Procedure: Coronary Angiogram;  Surgeon: Adriana Schultz MD;  Location: [facility];  Service: Cardiology     CV LEFT HEART CATHETERIZATION WITHOUT LEFT VENTRICULOGRAM Left 9/7/2020    Procedure: Left Heart Catheterization Without Left Ventriculogram;  Surgeon: Adriana Schultz MD;   Location: [facility];  Service: Cardiology     HYSTERECTOMY      age 30's     WRIST SURGERY Left     Ligament Surgery     Family History   Problem Relation Age of Onset     Arthritis Mother      Kidney Disease Father      Heart Disease Sister      Colon Cancer Brother      Cancer Maternal Aunt      Social History     Tobacco Use     Smoking status: Some Days     Packs/day: 1.00     Years: 30.00     Pack years: 30.00     Types: Cigarettes     Smokeless tobacco: Never     Tobacco comments:     Smoking Cessation Packet provided   Vaping Use     Vaping status: Not on file   Substance Use Topics     Alcohol use: No              The plan of care was discussed with the patient. They understand and agree with the course of treatment prescribed. A printed summary was given including instructions and medications.  The use of Dragon/[employer] dictation services may have been used to construct the content in this note; any grammatical or spelling errors are non-intentional. Please contact the author of this note directly if you are in need of any clarification.

## 2023-05-26 NOTE — PATIENT INSTRUCTIONS
If you have any new or worsening chest pain, shortness of breath or symptoms or not improving go to the emergency room.  If your oxygen consistently drops below 90% go to the emergency room

## 2023-05-27 ENCOUNTER — OFFICE VISIT (OUTPATIENT)
Dept: FAMILY MEDICINE | Facility: CLINIC | Age: 73
End: 2023-05-27
Payer: MEDICARE

## 2023-05-27 VITALS — OXYGEN SATURATION: 97 %

## 2023-05-27 DIAGNOSIS — J44.1 COPD EXACERBATION (H): Primary | ICD-10-CM

## 2023-05-27 PROCEDURE — 99213 OFFICE O/P EST LOW 20 MIN: CPT | Performed by: PHYSICIAN ASSISTANT

## 2023-05-27 RX ORDER — PREDNISONE 20 MG/1
TABLET ORAL
Qty: 18 TABLET | Refills: 0 | Status: SHIPPED | OUTPATIENT
Start: 2023-05-27

## 2023-05-27 RX ORDER — SULFAMETHOXAZOLE/TRIMETHOPRIM 800-160 MG
1 TABLET ORAL 2 TIMES DAILY
Qty: 20 TABLET | Refills: 0 | Status: SHIPPED | OUTPATIENT
Start: 2023-05-27 | End: 2023-06-06

## 2023-05-27 NOTE — PATIENT INSTRUCTIONS
(J44.1) COPD exacerbation (H)  (primary encounter diagnosis)  Comment: Prefers Septra to Doxycycline  Plan: sulfamethoxazole-trimethoprim (BACTRIM DS)         800-160 MG tablet, predniSONE (DELTASONE) 20 MG        Tablet    Continue neb treatments at home.      Follow up with your primary clinic should symptoms persist or worsen.

## 2023-05-27 NOTE — PROGRESS NOTES
(J44.1) COPD exacerbation (H)  (primary encounter diagnosis)  Comment: Prefers Septra to Doxycycline  Plan: sulfamethoxazole-trimethoprim (BACTRIM DS)         800-160 MG tablet, predniSONE (DELTASONE) 20 MG        Tablet    Continue neb treatments at home.      Follow up with your primary clinic should symptoms persist or worsen.      Patient expresses understanding and agreement with the assessment and plan as above.          SUBJECTIVE:   Bárbara Perez is a 72 year old female who presents today requesting a different antibiotic for her COPD exacerbation.  She was seen yesterday and prescribed doxycycline.  She states she cannot take this because the capsule has color, and she is allergic to all dyes.    She has been using her nebulizer at home with some improvement in her symptoms, but not complete resolution.    SH: She is under a lot of stress today, as her  currently has cancer, and is home alone, so she is concerned about him.  She is here with her sister today who also has cancer.      Past Medical History:   Diagnosis Date     Asthma      CAD (coronary artery disease)      Chest pain      COPD (chronic obstructive pulmonary disease) (H)      Diabetes mellitus (H)      GERD (gastroesophageal reflux disease)      Heart attack (H)      HTN (hypertension)      Hypothyroidism      Psoriasis      Seasonal allergies      Sternum pain     Following Cardiac Bypass         Current Outpatient Medications   Medication Sig Dispense Refill     Multiple Vitamins-Iron (DAILY-KASSIE/IRON/BETA-CAROTENE) TABS TAKE 1 TABLET BY MOUTH DAILY. (Patient not taking: Reported on 10/19/2020) 30 tablet 7     Social History     Tobacco Use     Smoking status: Never Smoker     Smokeless tobacco: Never Used   Substance Use Topics     Alcohol use: Not on file     Family History   Problem Relation Age of Onset     Diabetes Mother      Diabetes Father          ROS:    10 point ROS of systems including Constitutional, Eyes,  Respiratory, Cardiovascular, Gastroenterology, Genitourinary, Integumentary, Muscularskeletal, Psychiatric ,neurological were all negative except for pertinent positives noted in my HPI       OBJECTIVE:  SpO2 97%   Patient declines any other vitals today.    Physical Exam:  GENERAL APPEARANCE: healthy, alert and no distress  RESP: Few scattered wheezes  CV: regular rates and rhythm, normal S1 S2, no murmur noted  SKIN: no suspicious lesions or rashes

## 2024-07-22 ENCOUNTER — HOSPITAL ENCOUNTER (EMERGENCY)
Facility: HOSPITAL | Age: 74
Discharge: LEFT BEFORE DISCHARGE | End: 2024-07-22
Admitting: EMERGENCY MEDICINE
Payer: COMMERCIAL

## 2024-07-22 VITALS
HEART RATE: 94 BPM | TEMPERATURE: 98.5 F | DIASTOLIC BLOOD PRESSURE: 92 MMHG | SYSTOLIC BLOOD PRESSURE: 184 MMHG | OXYGEN SATURATION: 94 % | RESPIRATION RATE: 19 BRPM

## 2024-07-22 PROCEDURE — 99281 EMR DPT VST MAYX REQ PHY/QHP: CPT

## 2024-07-22 ASSESSMENT — COLUMBIA-SUICIDE SEVERITY RATING SCALE - C-SSRS
1. IN THE PAST MONTH, HAVE YOU WISHED YOU WERE DEAD OR WISHED YOU COULD GO TO SLEEP AND NOT WAKE UP?: NO
2. HAVE YOU ACTUALLY HAD ANY THOUGHTS OF KILLING YOURSELF IN THE PAST MONTH?: NO
6. HAVE YOU EVER DONE ANYTHING, STARTED TO DO ANYTHING, OR PREPARED TO DO ANYTHING TO END YOUR LIFE?: NO
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
## 2024-07-22 NOTE — ED NOTES
Called to room patient, no response. Registration also noted she called for patient 20 minutes ago with no response.

## 2024-12-09 ENCOUNTER — HOSPITAL ENCOUNTER (EMERGENCY)
Facility: CLINIC | Age: 74
Discharge: HOME | End: 2024-12-09
Attending: EMERGENCY MEDICINE | Admitting: EMERGENCY MEDICINE
Payer: COMMERCIAL

## 2024-12-09 VITALS
HEART RATE: 91 BPM | RESPIRATION RATE: 34 BRPM | HEIGHT: 62 IN | SYSTOLIC BLOOD PRESSURE: 183 MMHG | WEIGHT: 210 LBS | OXYGEN SATURATION: 95 % | DIASTOLIC BLOOD PRESSURE: 77 MMHG | TEMPERATURE: 97.7 F | BODY MASS INDEX: 38.64 KG/M2

## 2024-12-09 DIAGNOSIS — R40.0 DROWSINESS: ICD-10-CM

## 2024-12-09 DIAGNOSIS — J44.1 COPD EXACERBATION (H): ICD-10-CM

## 2024-12-09 LAB
ALBUMIN UR-MCNC: NEGATIVE MG/DL
ANION GAP SERPL CALCULATED.3IONS-SCNC: 8 MMOL/L (ref 7–15)
APPEARANCE UR: CLEAR
ATRIAL RATE - MUSE: 73 BPM
ATRIAL RATE - MUSE: 74 BPM
BASOPHILS # BLD AUTO: 0.1 10E3/UL (ref 0–0.2)
BASOPHILS NFR BLD AUTO: 1 %
BILIRUB UR QL STRIP: NEGATIVE
BUN SERPL-MCNC: 10.5 MG/DL (ref 8–23)
CALCIUM SERPL-MCNC: 9.1 MG/DL (ref 8.8–10.4)
CHLORIDE SERPL-SCNC: 103 MMOL/L (ref 98–107)
COLOR UR AUTO: ABNORMAL
CREAT SERPL-MCNC: 1.23 MG/DL (ref 0.51–0.95)
DIASTOLIC BLOOD PRESSURE - MUSE: 77 MMHG
DIASTOLIC BLOOD PRESSURE - MUSE: 90 MMHG
EGFRCR SERPLBLD CKD-EPI 2021: 46 ML/MIN/1.73M2
EOSINOPHIL # BLD AUTO: 0.3 10E3/UL (ref 0–0.7)
EOSINOPHIL NFR BLD AUTO: 5 %
ERYTHROCYTE [DISTWIDTH] IN BLOOD BY AUTOMATED COUNT: 13.4 % (ref 10–15)
GLUCOSE SERPL-MCNC: 107 MG/DL (ref 70–99)
GLUCOSE UR STRIP-MCNC: NEGATIVE MG/DL
HCO3 SERPL-SCNC: 29 MMOL/L (ref 22–29)
HCT VFR BLD AUTO: 48.4 % (ref 35–47)
HGB BLD-MCNC: 16.4 G/DL (ref 11.7–15.7)
HGB UR QL STRIP: NEGATIVE
IMM GRANULOCYTES # BLD: 0 10E3/UL
IMM GRANULOCYTES NFR BLD: 0 %
INTERPRETATION ECG - MUSE: NORMAL
INTERPRETATION ECG - MUSE: NORMAL
KETONES UR STRIP-MCNC: NEGATIVE MG/DL
LEUKOCYTE ESTERASE UR QL STRIP: NEGATIVE
LYMPHOCYTES # BLD AUTO: 1.2 10E3/UL (ref 0.8–5.3)
LYMPHOCYTES NFR BLD AUTO: 25 %
MCH RBC QN AUTO: 30.6 PG (ref 26.5–33)
MCHC RBC AUTO-ENTMCNC: 33.9 G/DL (ref 31.5–36.5)
MCV RBC AUTO: 90 FL (ref 78–100)
MONOCYTES # BLD AUTO: 0.3 10E3/UL (ref 0–1.3)
MONOCYTES NFR BLD AUTO: 7 %
MUCOUS THREADS #/AREA URNS LPF: PRESENT /LPF
NEUTROPHILS # BLD AUTO: 3 10E3/UL (ref 1.6–8.3)
NEUTROPHILS NFR BLD AUTO: 62 %
NITRATE UR QL: NEGATIVE
NRBC # BLD AUTO: 0 10E3/UL
NRBC BLD AUTO-RTO: 0 /100
P AXIS - MUSE: 57 DEGREES
P AXIS - MUSE: NORMAL DEGREES
PH UR STRIP: 5.5 [PH] (ref 5–7)
PLATELET # BLD AUTO: 192 10E3/UL (ref 150–450)
POTASSIUM SERPL-SCNC: 4.3 MMOL/L (ref 3.4–5.3)
PR INTERVAL - MUSE: 166 MS
PR INTERVAL - MUSE: 180 MS
QRS DURATION - MUSE: 64 MS
QRS DURATION - MUSE: 66 MS
QT - MUSE: 408 MS
QT - MUSE: 414 MS
QTC - MUSE: 449 MS
QTC - MUSE: 459 MS
R AXIS - MUSE: 162 DEGREES
R AXIS - MUSE: 19 DEGREES
RBC # BLD AUTO: 5.36 10E6/UL (ref 3.8–5.2)
RBC URINE: 1 /HPF
SODIUM SERPL-SCNC: 140 MMOL/L (ref 135–145)
SP GR UR STRIP: 1.01 (ref 1–1.03)
SQUAMOUS EPITHELIAL: 2 /HPF
SYSTOLIC BLOOD PRESSURE - MUSE: 160 MMHG
SYSTOLIC BLOOD PRESSURE - MUSE: 183 MMHG
T AXIS - MUSE: 165 DEGREES
T AXIS - MUSE: 50 DEGREES
TROPONIN T SERPL HS-MCNC: 16 NG/L
TROPONIN T SERPL HS-MCNC: 17 NG/L
UROBILINOGEN UR STRIP-MCNC: <2 MG/DL
VENTRICULAR RATE- MUSE: 73 BPM
VENTRICULAR RATE- MUSE: 74 BPM
WBC # BLD AUTO: 4.8 10E3/UL (ref 4–11)
WBC URINE: 1 /HPF

## 2024-12-09 PROCEDURE — 36415 COLL VENOUS BLD VENIPUNCTURE: CPT | Performed by: EMERGENCY MEDICINE

## 2024-12-09 PROCEDURE — 93005 ELECTROCARDIOGRAM TRACING: CPT | Performed by: EMERGENCY MEDICINE

## 2024-12-09 PROCEDURE — 99284 EMERGENCY DEPT VISIT MOD MDM: CPT | Mod: 25 | Performed by: EMERGENCY MEDICINE

## 2024-12-09 PROCEDURE — 85004 AUTOMATED DIFF WBC COUNT: CPT | Performed by: EMERGENCY MEDICINE

## 2024-12-09 PROCEDURE — 94640 AIRWAY INHALATION TREATMENT: CPT

## 2024-12-09 PROCEDURE — 82565 ASSAY OF CREATININE: CPT | Performed by: EMERGENCY MEDICINE

## 2024-12-09 PROCEDURE — 250N000009 HC RX 250: Performed by: EMERGENCY MEDICINE

## 2024-12-09 PROCEDURE — 82310 ASSAY OF CALCIUM: CPT | Performed by: EMERGENCY MEDICINE

## 2024-12-09 PROCEDURE — 81003 URINALYSIS AUTO W/O SCOPE: CPT | Performed by: EMERGENCY MEDICINE

## 2024-12-09 PROCEDURE — 84484 ASSAY OF TROPONIN QUANT: CPT | Performed by: EMERGENCY MEDICINE

## 2024-12-09 PROCEDURE — 999N000157 HC STATISTIC RCP TIME EA 10 MIN

## 2024-12-09 RX ORDER — PREDNISONE 20 MG/1
TABLET ORAL
Qty: 18 TABLET | Refills: 0 | Status: SHIPPED | OUTPATIENT
Start: 2024-12-09

## 2024-12-09 RX ORDER — IPRATROPIUM BROMIDE AND ALBUTEROL SULFATE 2.5; .5 MG/3ML; MG/3ML
3 SOLUTION RESPIRATORY (INHALATION) ONCE
Status: COMPLETED | OUTPATIENT
Start: 2024-12-09 | End: 2024-12-09

## 2024-12-09 RX ADMIN — IPRATROPIUM BROMIDE AND ALBUTEROL SULFATE 3 ML: .5; 3 SOLUTION RESPIRATORY (INHALATION) at 17:07

## 2024-12-09 ASSESSMENT — ACTIVITIES OF DAILY LIVING (ADL)
ADLS_ACUITY_SCORE: 41
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
## 2024-12-09 NOTE — ED TRIAGE NOTES
Pt presents to the ED from private home via [facility] with c/o of several syncopal episodes over the past 3 months. Pt reports feeling that she just all of a sudden has to fall asleep.       Triage Assessment (Adult)       Row Name 12/09/24 143          Triage Assessment    Airway WDL WDL        Respiratory WDL    Respiratory WDL WDL        Skin Circulation/Temperature WDL    Skin Circulation/Temperature WDL WDL        Cardiac WDL    Cardiac WDL WDL        Peripheral/Neurovascular WDL    Peripheral Neurovascular WDL WDL        Cognitive/Neuro/Behavioral WDL    Cognitive/Neuro/Behavioral WDL WDL

## 2024-12-10 NOTE — DISCHARGE INSTRUCTIONS
Please follow-up with your regular doctor to go over your medications.  Please hold use of Benadryl as this can be causing it to be increasingly sedated.  Tizanidine can also cause you to be sedated.

## 2025-01-15 ENCOUNTER — OFFICE VISIT (OUTPATIENT)
Dept: URGENT CARE | Facility: URGENT CARE | Age: 75
End: 2025-01-15
Payer: COMMERCIAL

## 2025-01-15 VITALS
HEART RATE: 78 BPM | SYSTOLIC BLOOD PRESSURE: 178 MMHG | TEMPERATURE: 98 F | OXYGEN SATURATION: 98 % | RESPIRATION RATE: 20 BRPM | DIASTOLIC BLOOD PRESSURE: 78 MMHG

## 2025-01-15 DIAGNOSIS — F17.200 SMOKING: ICD-10-CM

## 2025-01-15 DIAGNOSIS — J01.00 ACUTE NON-RECURRENT MAXILLARY SINUSITIS: Primary | ICD-10-CM

## 2025-01-15 PROCEDURE — 99213 OFFICE O/P EST LOW 20 MIN: CPT | Performed by: PHYSICIAN ASSISTANT

## 2025-01-15 RX ORDER — SULFAMETHOXAZOLE AND TRIMETHOPRIM 400; 80 MG/1; MG/1
1 TABLET ORAL 2 TIMES DAILY
Qty: 10 TABLET | Refills: 0 | Status: SHIPPED | OUTPATIENT
Start: 2025-01-15 | End: 2025-01-20

## 2025-01-15 NOTE — PROGRESS NOTES
Patient presents with:  Sinus Problem: Poss sinus infection X2 wks  Head pressure and face   Pt was seen in the ER X2 months ago not feeling better   Pt has not take her BP today       Clinical Decision Making:  Patient is treated for sinusitis with Bactrim head is requested by patient requesting antibiotic by name. Creatinine clearance is 112, was calculated for renally adjusted dose of the Bactrim.  No adjustment was necessary. Expected course of resolution and indication for return was gone over and questions were answered to patient/parent's satisfaction before discharge.        ICD-10-CM    1. Acute non-recurrent maxillary sinusitis  J01.00 sulfamethoxazole-trimethoprim (BACTRIM) 400-80 MG tablet      2. Smoking  F17.200           Patient Instructions     Over-the-counter nasal steroid spray, follow packaging directions  Over-the-counter Mucinex, follow packaging directions  Hot packs 3 times per day to the forehead and face over the tender sinuses  Nasal saline irrigation or Rosa Noel for congestion  Antibiotic as written below.  Risks and benefits of the medication were gone over.  Indication for return to see urgent care or family practice provider for reevaluation and treatment.          HPI:  Bárbara Perez is a 74 year old female who is a smoker who presents today for 2 day of acute onset facial frontal and maxillary pain and pressure that is radiating to the teeth and to the jaw.  Patient has a headache that is made worse with dependency.  Postnasal drainage.  Denies fever chills night sweats epistaxis or other constitutional symptoms.  Has not tried any treatment for this at home.    History obtained from chart review and the patient.    Problem List:  2022-06: Cardiomyopathy, unspecified type (H)  2021-06: Obesity (BMI 35.0-39.9) with comorbidity (H)  2021-01: Costochondritis  2021-01: Hemiplegia affecting nondominant side (H)  2021-01: Left hemiparesis (H)  2020-09: Disc degeneration  2020-06:  Chronic eczema  2020-06: Insomnia, idiopathic  2020-06: Controlled type 2 diabetes mellitus without complication,   without long-term current use of insulin (H)  2020-06: Dermatitis  2020-06: Uncontrolled type 2 diabetes mellitus with hyperglycemia (H)  2017-06: Chest pain  2017-04: Depression, major, recurrent, moderate (H)  2017-01: Mixed hyperlipidemia due to type 2 diabetes mellitus (H)  2017-01: UTI (urinary tract infection)  2016-02: Grief  2016-02: Adjustment disorder with depressed mood  2016-02: Severe episode of recurrent major depressive disorder,   without psychotic features (H)  2015-12: Anxiety  2015-04: Obese  2014-08: Coronary artery disease involving native coronary artery of   native heart, angina presence unspecified  2014-08: GERD (gastroesophageal reflux disease)  2012-12: Hand pain  2012-04: PTSD (post-traumatic stress disorder)  2010-04: Dyslipidemia  2010-04: Essential hypertension  2010-04: Osteoarthritis  2010-04: Type 2 diabetes mellitus (H)  2010-04: Vitamin D deficiency  2010-04: Chronic back pain  2010-04: Hypertriglyceridemia  2008-07: Asthma  2008-07: Hypothyroidism  NSTEMI (non-ST elevated myocardial infarction) (H)  S/P CABG x 3  COPD exacerbation (H)  Elevated lipase  Tobacco abuse  ACS (acute coronary syndrome) (H)  ST elevation myocardial infarction involving right coronary artery (H)  Mixed hyperlipidemia      Past Medical History:   Diagnosis Date    Asthma     CAD (coronary artery disease)     Chest pain     COPD (chronic obstructive pulmonary disease) (H)     Diabetes mellitus (H)     GERD (gastroesophageal reflux disease)     Heart attack (H)     HTN (hypertension)     Hypothyroidism     Psoriasis     Seasonal allergies     Sternum pain     Following Cardiac Bypass       Social History     Tobacco Use    Smoking status: Some Days     Current packs/day: 1.00     Average packs/day: 1 pack/day for 30.0 years (30.0 ttl pk-yrs)     Types: Cigarettes    Smokeless tobacco: Never     Tobacco comments:     Smoking Cessation Packet provided   Substance Use Topics    Alcohol use: No       Review of Systems  As above in HPI otherwise negative.    Vitals:    01/15/25 1303   BP: (!) 178/78   Pulse: 78   Resp: 20   Temp: 98  F (36.7  C)   SpO2: 98%       General: Patient is resting comfortably no acute distress is afebrile  HEENT: Head is normocephalic atraumatic   Frontal and maxillary sinuses are tender to percussion  eyes are PERRL   EOMI sclera anicteric   TMs are clear bilaterally  Throat is with mild posterior pharyngeal wall exudate but no erythema  No cervical lymphadenopathy present  Lungs: Clear to auscultation bilaterally  Heart: Regular rate and rhythm  Skin: Without rash non-diaphoretic    Physical Exam    At the end of the encounter, I discussed results, diagnosis, medications. Discussed red flags for immediate return to clinic/ER, as well as indications for follow up if no improvement. Patient understood and agreed to plan. Patient was stable for discharge.

## 2025-01-15 NOTE — PATIENT INSTRUCTIONS
Over-the-counter nasal steroid spray, follow packaging directions  Over-the-counter Mucinex, follow packaging directions  Hot packs 3 times per day to the forehead and face over the tender sinuses  Nasal saline irrigation or Rosa Noel for congestion  Antibiotic as written below.  Risks and benefits of the medication were gone over.  Indication for return to see urgent care or family practice provider for reevaluation and treatment.

## 2025-02-28 ENCOUNTER — HOSPITAL ENCOUNTER (EMERGENCY)
Facility: CLINIC | Age: 75
Discharge: HOME | End: 2025-02-28
Attending: EMERGENCY MEDICINE | Admitting: EMERGENCY MEDICINE
Payer: COMMERCIAL

## 2025-02-28 VITALS
HEART RATE: 71 BPM | BODY MASS INDEX: 40.48 KG/M2 | DIASTOLIC BLOOD PRESSURE: 102 MMHG | WEIGHT: 220 LBS | TEMPERATURE: 97.7 F | RESPIRATION RATE: 18 BRPM | HEIGHT: 62 IN | SYSTOLIC BLOOD PRESSURE: 224 MMHG | OXYGEN SATURATION: 98 %

## 2025-02-28 DIAGNOSIS — Z86.39 HISTORY OF DIABETES MELLITUS: ICD-10-CM

## 2025-02-28 DIAGNOSIS — I15.8 OTHER SECONDARY HYPERTENSION: ICD-10-CM

## 2025-02-28 LAB — GLUCOSE BLDC GLUCOMTR-MCNC: 91 MG/DL (ref 70–99)

## 2025-02-28 PROCEDURE — 99283 EMERGENCY DEPT VISIT LOW MDM: CPT

## 2025-02-28 PROCEDURE — 82962 GLUCOSE BLOOD TEST: CPT

## 2025-02-28 ASSESSMENT — COLUMBIA-SUICIDE SEVERITY RATING SCALE - C-SSRS
1. IN THE PAST MONTH, HAVE YOU WISHED YOU WERE DEAD OR WISHED YOU COULD GO TO SLEEP AND NOT WAKE UP?: NO
6. HAVE YOU EVER DONE ANYTHING, STARTED TO DO ANYTHING, OR PREPARED TO DO ANYTHING TO END YOUR LIFE?: NO
2. HAVE YOU ACTUALLY HAD ANY THOUGHTS OF KILLING YOURSELF IN THE PAST MONTH?: NO

## 2025-03-01 NOTE — ED NOTES
"Patient left prior to discharge paper work being provided. Told the [facility] \"If my sugar is good, I am leaving\" and proceeded to leave.   "

## 2025-03-01 NOTE — ED PROVIDER NOTES
"EMERGENCY DEPARTMENT ENCOUNTER            IMPRESSION:  Normal blood glucose      MEDICAL DECISION MAKING:  Bárbara Perez who presented for evaluation of blood sugar    Her blood sugar was checked in triage.  Blood sugar was 91    She left prior to further evaluation      =================================================================  CHIEF COMPLAINT:  Chief Complaint   Patient presents with    Blood Sugar Problem         HPI  She was not interviewed; information from nursing note    Bárbara Perez is a 74 year old female with a history of CKD3, acute coronary ischemic heart disease, T2DM, COPD, HLD, and CAD s/p CABG x 3, who presents to the ED by walk-in for evaluation of concerns regarding her blood sugar.    The patient reports that she \"feels like [her] blood sugar is low\". She states that she's been feeling \"more hungry and tired this evening\". Notes that her home glucometer is broken. Per nursing staff, she is alert and oriented x 4. Per triage nurse, her CBS was checked in triage, and her blood sugar was 91.        PAST MEDICAL HISTORY:  Past Medical History:   Diagnosis Date    Asthma     CAD (coronary artery disease)     Chest pain     COPD (chronic obstructive pulmonary disease) (H)     Diabetes mellitus (H)     GERD (gastroesophageal reflux disease)     Heart attack (H)     HTN (hypertension)     Hypothyroidism     Psoriasis     Seasonal allergies     Sternum pain     Following Cardiac Bypass       PAST SURGICAL HISTORY:  Past Surgical History:   Procedure Laterality Date    APPENDECTOMY      CARDIAC CATHETERIZATION  01/26/2017    CARDIAC CATHETERIZATION N/A 1/26/2017    Procedure: Coronary Angiogram;  Surgeon: Arsen Anderson MD;  Location: [facility] Cath Lab;  Service:     CARDIAC SURGERY  2008    Triple Bypass    CV CORONARY ANGIOGRAM N/A 9/7/2020    Procedure: Coronary Angiogram;  Surgeon: Adriana Schultz MD;  Location: [facility] Cath Lab;  Service: Cardiology    CV LEFT HEART " CATHETERIZATION WITHOUT LEFT VENTRICULOGRAM Left 9/7/2020    Procedure: Left Heart Catheterization Without Left Ventriculogram;  Surgeon: Adriana Schultz MD;  Location: [facility] Cath Lab;  Service: Cardiology    HYSTERECTOMY      age 29's    WRIST SURGERY Left     Ligament Surgery         CURRENT MEDICATIONS:    acetaminophen (TYLENOL) 500 MG tablet  ADVAIR -21 mcg/actuation inhaler  albuterol (PROVENTIL) 5 mg/mL nebulizer solution  aspirin (ASA) 81 MG EC tablet  aspirin 81 mg chewable tablet  blood glucose test (ACCU-CHEK ACTIVE TEST) strips  blood glucose test (GLUCOSE BLOOD) strips  clopidogreL (PLAVIX) 75 mg tablet  compressor, for nebulizer Mellissa  diphenhydrAMINE (BENADRYL) 25 mg capsule  fluticasone (FLONASE) 50 mcg/actuation nasal spray  generic lancets (ACCU-CHEK MULTICLIX LANCET)  glipiZIDE (GLUCOTROL) 5 MG tablet  ipratropium - albuterol 0.5 mg/2.5 mg/3 mL (DUONEB) 0.5-2.5 (3) MG/3ML neb solution  ipratropium-albuterol (DUO-NEB) 0.5-2.5 mg/3 mL nebulizer  levothyroxine (SYNTHROID, LEVOTHROID) 50 MCG tablet  loratadine (CLARITIN) 10 mg tablet  metoprolol tartrate (LOPRESSOR) 25 MG tablet  nitroglycerin (NITROSTAT) 0.4 MG SL tablet  predniSONE (DELTASONE) 20 MG tablet  PROAIR HFA 90 mcg/actuation inhaler  simethicone (MYLICON) 80 MG chewable tablet  tiZANidine (ZANAFLEX) 2 MG tablet  triamcinolone (KENALOG) 0.1 % cream        ALLERGIES:  Allergies   Allergen Reactions    Atorvastatin Shortness Of Breath and Swelling     Was previously deleted but on current encounter (6/20/17), patient told me to add as an allergy.     Cefdinir Shortness Of Breath and Itching    Dye [External Allergen Needs Reconciliation - See Comment] Other (See Comments)     Allergy to all dyes.  Cannot take any medications with dyes or color. Causes rapid heartrate, sweating.  Patient unsure of other symptoms but states they are severe.    Ezetimibe Shortness Of Breath and Swelling     Spoke with patient 9/8/20, stated reaction  was shoulder pain, and she never experienced shortness of breath from zetia.     Latex Anaphylaxis    Penicillin V Shortness Of Breath and Rash    Amoxicillin     Cetirizine Unknown     unknown    Chocolate Laxative [Senna] Unknown    Codeine Unknown    Doxycycline      Patient states that she is unable to take the doxycycline prescribed due to the fact that it has color, she is allergic to ALL dyes.      Egg Derived [Chicken-Derived Products (Egg)] Unknown    Fenofibrate Unknown    Influenza Virus Vaccines [Influenza Vaccines] Unknown    Iodine Unknown    Ioxaglate Sodium [Ioxaglate] Itching    Latex Unknown    Peanut Oil Unknown    Toradol [Ketorolac] Unknown     Ulcers, takes aspirin daily at home and Aleve as needed       FAMILY HISTORY:  Family History   Problem Relation Age of Onset    Arthritis Mother     Kidney Disease Father     Heart Disease Sister     Colon Cancer Brother     Cancer Maternal Aunt        SOCIAL HISTORY:   Social History     Socioeconomic History    Marital status:    Tobacco Use    Smoking status: Some Days     Current packs/day: 1.00     Average packs/day: 1 pack/day for 30.0 years (30.0 ttl pk-yrs)     Types: Cigarettes    Smokeless tobacco: Never    Tobacco comments:     Smoking Cessation Packet provided   Substance and Sexual Activity    Alcohol use: No    Drug use: No    Sexual activity: Never   Social History Narrative    Patient presented to the ED with her  with a complaint of pain and cough 01/22/17       Social Drivers of Health     Financial Resource Strain: Medium Risk (2/5/2024)    Received from [facility]    Financial Resource Strain     Difficulty of Paying Living Expenses: 2     Difficulty of Paying Living Expenses: 1   Food Insecurity: No Food Insecurity (2/5/2024)    Received from [facility]    Food Insecurity     Do you worry your food will run out before you are able to buy more?: 1  "  Transportation Needs: No Transportation Needs (2/5/2024)    Received from [facility]    Transportation Needs     Does lack of transportation keep you from medical appointments?: 1     Does lack of transportation keep you from work, meetings or getting things that you need?: 1   Physical Activity: Insufficiently Active (3/15/2021)    Received from [facility]    Exercise Vital Sign     Days of Exercise per Week: 2 days     Minutes of Exercise per Session: 20 min   Stress: Stress Concern Present (3/15/2021)    Received from [facility]    [employer] - Occupational Stress Questionnaire     Feeling of Stress : To some extent   Social Connections: Socially Isolated (2/5/2024)    Received from [facility]    Social Connections     Do you often feel lonely or isolated from those around you?: 4   Housing Stability: Low Risk  (2/5/2024)    Received from [facility]    Housing Stability     What is your housing situation today?: 1       PHYSICAL EXAM:    BP (!) 224/102   Pulse 71   Temp 97.7  F (36.5  C) (Oral)   Resp 18   Ht 1.575 m (5' 2\")   Wt 99.8 kg (220 lb)   SpO2 98%   BMI 40.24 kg/m        No exam          LAB:  Laboratory results were independently reviewed and interpreted  Results for orders placed or performed during the hospital encounter of 02/28/25   Glucose by meter   Result Value Ref Range    GLUCOSE BY METER POCT 91 70 - 99 mg/dL         FINAL DIAGNOSIS:    ICD-10-CM    1. Other secondary hypertension  I15.8       2. History of diabetes mellitus  Z86.39                  NAME: Bárbara Perez  AGE: 74 year old female  YOB: 1950  MRN: [id number]  EVALUATION DATE & TIME: No admission date for patient encounter.    PCP: [facility]    ED PROVIDER: ERICA Pryor M.D.  Emergency Medicine  [facility]" [facility] EMERGENCY ROOM  [address] [address]  [phone]  Dept: [phone]  2/28/2025        Shun Hargrove MD  02/28/25 2127       Shun Hargrove MD  02/28/25 2129

## 2025-03-01 NOTE — ED TRIAGE NOTES
"Reports \"feels like my blood sugar is low\" -reporting feeling \"more hungry and tired this evening\"  Reports her home glucometer is broken   Alert and oriented x 4  CBS checked in triage (91)     Triage Assessment (Adult)       Row Name 02/28/25 2004          Triage Assessment    Airway WDL WDL        Respiratory WDL    Respiratory WDL WDL        Skin Circulation/Temperature WDL    Skin Circulation/Temperature WDL WDL        Cardiac WDL    Cardiac WDL WDL        Peripheral/Neurovascular WDL    Peripheral Neurovascular WDL WDL        Cognitive/Neuro/Behavioral WDL    Cognitive/Neuro/Behavioral WDL WDL                     "

## 2025-06-04 ENCOUNTER — HOSPITAL ENCOUNTER (OUTPATIENT)
Dept: GENERAL RADIOLOGY | Facility: HOSPITAL | Age: 75
Discharge: HOME | End: 2025-06-04
Attending: PHYSICIAN ASSISTANT
Payer: COMMERCIAL

## 2025-06-04 ENCOUNTER — OFFICE VISIT (OUTPATIENT)
Dept: URGENT CARE | Facility: URGENT CARE | Age: 75
End: 2025-06-04
Payer: COMMERCIAL

## 2025-06-04 VITALS
HEART RATE: 75 BPM | RESPIRATION RATE: 18 BRPM | TEMPERATURE: 97.9 F | OXYGEN SATURATION: 95 % | DIASTOLIC BLOOD PRESSURE: 111 MMHG | SYSTOLIC BLOOD PRESSURE: 163 MMHG

## 2025-06-04 DIAGNOSIS — J02.9 SORE THROAT: Primary | ICD-10-CM

## 2025-06-04 DIAGNOSIS — Z11.52 ENCOUNTER FOR SCREENING FOR COVID-19: ICD-10-CM

## 2025-06-04 DIAGNOSIS — R06.02 SHORTNESS OF BREATH: ICD-10-CM

## 2025-06-04 DIAGNOSIS — R05.1 ACUTE COUGH: ICD-10-CM

## 2025-06-04 LAB
ANION GAP SERPL CALCULATED.3IONS-SCNC: 4 MMOL/L (ref 3–14)
ATRIAL RATE - MUSE: 76 BPM
BASOPHILS # BLD AUTO: 0 10E3/UL (ref 0–0.2)
BASOPHILS NFR BLD AUTO: 0 %
BUN SERPL-MCNC: 13 MG/DL (ref 7–30)
CALCIUM SERPL-MCNC: 9.1 MG/DL (ref 8.5–10.1)
CHLORIDE BLD-SCNC: 108 MMOL/L (ref 94–109)
CO2 SERPL-SCNC: 29 MMOL/L (ref 20–32)
CREAT SERPL-MCNC: 1.2 MG/DL (ref 0.52–1.04)
DEPRECATED S PYO AG THROAT QL EIA: NEGATIVE
DIASTOLIC BLOOD PRESSURE - MUSE: NORMAL MMHG
EGFRCR SERPLBLD CKD-EPI 2021: 47 ML/MIN/1.73M2
EOSINOPHIL # BLD AUTO: 0.2 10E3/UL (ref 0–0.7)
EOSINOPHIL NFR BLD AUTO: 4 %
ERYTHROCYTE [DISTWIDTH] IN BLOOD BY AUTOMATED COUNT: 13.4 % (ref 10–15)
GLUCOSE BLD-MCNC: 165 MG/DL (ref 70–99)
HCT VFR BLD AUTO: 48.5 % (ref 35–47)
HGB BLD-MCNC: 16.1 G/DL (ref 11.7–15.7)
IMM GRANULOCYTES # BLD: 0 10E3/UL
IMM GRANULOCYTES NFR BLD: 0 %
INTERPRETATION ECG - MUSE: NORMAL
LYMPHOCYTES # BLD AUTO: 1.2 10E3/UL (ref 0.8–5.3)
LYMPHOCYTES NFR BLD AUTO: 21 %
MCH RBC QN AUTO: 30.1 PG (ref 26.5–33)
MCHC RBC AUTO-ENTMCNC: 33.2 G/DL (ref 31.5–36.5)
MCV RBC AUTO: 91 FL (ref 78–100)
MONOCYTES # BLD AUTO: 0.4 10E3/UL (ref 0–1.3)
MONOCYTES NFR BLD AUTO: 7 %
NEUTROPHILS # BLD AUTO: 3.8 10E3/UL (ref 1.6–8.3)
NEUTROPHILS NFR BLD AUTO: 67 %
P AXIS - MUSE: 63 DEGREES
PLATELET # BLD AUTO: 190 10E3/UL (ref 150–450)
POTASSIUM BLD-SCNC: 4 MMOL/L (ref 3.4–5.3)
PR INTERVAL - MUSE: 172 MS
QRS DURATION - MUSE: 62 MS
QT - MUSE: 406 MS
QTC - MUSE: 456 MS
R AXIS - MUSE: 40 DEGREES
RBC # BLD AUTO: 5.34 10E6/UL (ref 3.8–5.2)
S PYO DNA THROAT QL NAA+PROBE: NOT DETECTED
SARS-COV-2 RNA RESP QL NAA+PROBE: NEGATIVE
SODIUM SERPL-SCNC: 141 MMOL/L (ref 135–145)
SYSTOLIC BLOOD PRESSURE - MUSE: NORMAL MMHG
T AXIS - MUSE: 54 DEGREES
VENTRICULAR RATE- MUSE: 76 BPM
WBC # BLD AUTO: 5.6 10E3/UL (ref 4–11)

## 2025-06-04 PROCEDURE — 87635 SARS-COV-2 COVID-19 AMP PRB: CPT | Performed by: PHYSICIAN ASSISTANT

## 2025-06-04 PROCEDURE — 71046 X-RAY EXAM CHEST 2 VIEWS: CPT

## 2025-06-04 PROCEDURE — 87651 STREP A DNA AMP PROBE: CPT | Performed by: PHYSICIAN ASSISTANT

## 2025-06-04 PROCEDURE — 80048 BASIC METABOLIC PNL TOTAL CA: CPT | Performed by: PHYSICIAN ASSISTANT

## 2025-06-04 PROCEDURE — 85025 COMPLETE CBC W/AUTO DIFF WBC: CPT | Performed by: PHYSICIAN ASSISTANT

## 2025-06-04 PROCEDURE — 36415 COLL VENOUS BLD VENIPUNCTURE: CPT | Performed by: PHYSICIAN ASSISTANT

## 2025-06-04 PROCEDURE — 99214 OFFICE O/P EST MOD 30 MIN: CPT | Performed by: PHYSICIAN ASSISTANT

## 2025-06-04 PROCEDURE — 93005 ELECTROCARDIOGRAM TRACING: CPT | Performed by: PHYSICIAN ASSISTANT

## 2025-06-04 NOTE — PROGRESS NOTES
Patient presents with:  URI: 1 week cough,fever,chills,chest,pain,wheezing and sob.      Clinical Decision Making:  Strep test was negative.  Culture is to follow.  COVID testing is pending. Etiologies diagnoses were considered to include but not limited to endocarditis, cardiomyopathy, acute coronary syndrome, COVID, pneumonia, strep throat, COPD asthma exacerbation, angina and COVID-19.  Chest x-ray did not show infiltrate or consolidation, CBC did not show leukocytosis but did have signs of anemia which have been unchanged from previous values, COVID-19 screening test is pending.  Basic metabolic panel did have abnormalities with elevated creatinine that was unchanged from previous values.  Patient has trouble with patient has had a previous history of cardiomyopathy and angina.  Patient is sent to next level of care at the emergency room for evaluation of possible cardiomyopathy and the angina and further cardiac workup.  Patient will present by personal vehicle to the emergency room for evaluation and treatment.        ICD-10-CM    1. Sore throat  J02.9 Streptococcus A Rapid Screen w/Reflex to PCR - Clinic Collect     Group A Streptococcus PCR Throat Swab      2. Acute cough  R05.1 Streptococcus A Rapid Screen w/Reflex to PCR - Clinic Collect     CBC with Platelets & Differential     Basic metabolic panel     XR Chest 2 Views     COVID-19 Virus (Coronavirus) by PCR Nose     Group A Streptococcus PCR Throat Swab     CANCELED: COVID-19 12+ (PFIZER)      3. Encounter for screening for COVID-19  Z11.52 COVID-19 Virus (Coronavirus) by PCR Nose     CANCELED: COVID-19 12+ (PFIZER)      4. Shortness of breath  R06.02 EKG 12-lead, tracing only          Patient Instructions   Present to the ER for evaluation of your chest pain symptoms.        HPI:  Bárbara Perez is a 74 year old female who has a PMH of cardiomyopathy, history of NSTEMI and ST elevation myocardial infarction's, status post CABG x 3, history of  "hemiplegia, acute coronary syndrome, hypothyroidism, depression, asthma, COPD with current tobacco abuse, GERD, obesity, hyperlipidemia, hypertension and presents today for anginal symptoms.  Patient shares that she has had a 3-day history of cough.  Has had orthopnea and chest pressure with dizziness.  Patient has had nausea but no overt vomiting.  Has had bouts of diaphoresis.  Patient is currently smoking.  She also did not take her hypertensive medication this morning and has used Sudafed this morning to help with \"decongestion\".  No reported syncope presyncope or heart palpitations.    Patient did not her blood pressure medicine this morning.  Additionally, she did take an over-the-counter Sudafed for her cold symptoms a decongestion which may be contributing to her markedly elevated blood pressure.    History obtained from chart review and the patient.    Problem List:  2022-06: Cardiomyopathy, unspecified type (H)  2021-06: Obesity (BMI 35.0-39.9) with comorbidity (H)  2021-01: Costochondritis  2021-01: Hemiplegia affecting nondominant side (H)  2021-01: Left hemiparesis (H)  2020-09: Disc degeneration  2020-06: Chronic eczema  2020-06: Insomnia, idiopathic  2020-06: Controlled type 2 diabetes mellitus without complication,   without long-term current use of insulin (H)  2020-06: Dermatitis  2020-06: Uncontrolled type 2 diabetes mellitus with hyperglycemia (H)  2017-06: Chest pain  2017-04: Depression, major, recurrent, moderate (H)  2017-01: Mixed hyperlipidemia due to type 2 diabetes mellitus (H)  2017-01: UTI (urinary tract infection)  2016-02: Grief  2016-02: Adjustment disorder with depressed mood  2016-02: Severe episode of recurrent major depressive disorder,   without psychotic features (H)  2015-12: Anxiety  2015-04: Obese  2014-08: Coronary artery disease involving native coronary artery of   native heart, angina presence unspecified  2014-08: GERD (gastroesophageal reflux disease)  2012-12: Hand " pain  2012-04: PTSD (post-traumatic stress disorder)  2010-04: Dyslipidemia  2010-04: Essential hypertension  2010-04: Osteoarthritis  2010-04: Type 2 diabetes mellitus (H)  2010-04: Vitamin D deficiency  2010-04: Chronic back pain  2010-04: Hypertriglyceridemia  2008-07: Asthma  2008-07: Hypothyroidism  NSTEMI (non-ST elevated myocardial infarction) (H)  S/P CABG x 3  COPD exacerbation (H)  Elevated lipase  Tobacco abuse  ACS (acute coronary syndrome) (H)  ST elevation myocardial infarction involving right coronary artery (H)  Mixed hyperlipidemia      Past Medical History:   Diagnosis Date    Asthma     CAD (coronary artery disease)     Chest pain     COPD (chronic obstructive pulmonary disease) (H)     Diabetes mellitus (H)     GERD (gastroesophageal reflux disease)     Heart attack (H)     HTN (hypertension)     Hypothyroidism     Psoriasis     Seasonal allergies     Sternum pain     Following Cardiac Bypass       Social History     Tobacco Use    Smoking status: Some Days     Current packs/day: 1.00     Average packs/day: 1 pack/day for 30.0 years (30.0 ttl pk-yrs)     Types: Cigarettes    Smokeless tobacco: Never    Tobacco comments:     Smoking Cessation Packet provided   Substance Use Topics    Alcohol use: No       Review of Systems  As above in HPI otherwise negative.    Vitals:    06/04/25 1233 06/04/25 1235   BP: (!) 181/100 (!) 163/111   Pulse: 75    Resp: 18    Temp: 97.9  F (36.6  C)    TempSrc: Oral    SpO2: 95%        General: Patient is resting comfortably no acute distress is afebrile  HEENT: Head is normocephalic atraumatic   eyes are PERRL EOMI sclera anicteric   TMs are clear bilaterally  Throat is without pharyngeal wall erythema and no exudate  No cervical lymphadenopathy present  LUNGS: Clear to auscultation bilaterally  HEART: Regular rate and rhythm  Skin: Without rash non-diaphoretic    Physical Exam      Labs:  Results for orders placed or performed in visit on 06/04/25   XR Chest 2  Views     Status: None    Narrative    EXAM: XR CHEST 2 VIEWS  LOCATION: [facility]  DATE: 6/4/2025    INDICATION: Acute cough  COMPARISON: Chest radiograph 02/08/2024      Impression    IMPRESSION: Stable chest radiograph without acute cardiopulmonary abnormality.   Basic metabolic panel     Status: Abnormal   Result Value Ref Range    Sodium 141 135 - 145 mmol/L    Potassium 4.0 3.4 - 5.3 mmol/L    Chloride 108 94 - 109 mmol/L    Carbon Dioxide (CO2) 29 20 - 32 mmol/L    Anion Gap 4 3 - 14 mmol/L    Urea Nitrogen 13 7 - 30 mg/dL    Creatinine 1.20 (H) 0.52 - 1.04 mg/dL    GFR Estimate 47 (L) >60 mL/min/1.73m2    Calcium 9.1 8.5 - 10.1 mg/dL    Glucose 165 (H) 70 - 99 mg/dL   CBC with platelets and differential     Status: Abnormal   Result Value Ref Range    WBC Count 5.6 4.0 - 11.0 10e3/uL    RBC Count 5.34 (H) 3.80 - 5.20 10e6/uL    Hemoglobin 16.1 (H) 11.7 - 15.7 g/dL    Hematocrit 48.5 (H) 35.0 - 47.0 %    MCV 91 78 - 100 fL    MCH 30.1 26.5 - 33.0 pg    MCHC 33.2 31.5 - 36.5 g/dL    RDW 13.4 10.0 - 15.0 %    Platelet Count 190 150 - 450 10e3/uL    % Neutrophils 67 %    % Lymphocytes 21 %    % Monocytes 7 %    % Eosinophils 4 %    % Basophils 0 %    % Immature Granulocytes 0 %    Absolute Neutrophils 3.8 1.6 - 8.3 10e3/uL    Absolute Lymphocytes 1.2 0.8 - 5.3 10e3/uL    Absolute Monocytes 0.4 0.0 - 1.3 10e3/uL    Absolute Eosinophils 0.2 0.0 - 0.7 10e3/uL    Absolute Basophils 0.0 0.0 - 0.2 10e3/uL    Absolute Immature Granulocytes 0.0 <=0.4 10e3/uL   EKG 12-lead, tracing only     Status: None   Result Value Ref Range    Systolic Blood Pressure  mmHg    Diastolic Blood Pressure  mmHg    Ventricular Rate 76 BPM    Atrial Rate 76 BPM    MO Interval 172 ms    QRS Duration 62 ms     ms    QTc 456 ms    P Axis 63 degrees    R AXIS 40 degrees    T Axis 54 degrees    Interpretation ECG       Sinus rhythm  Normal ECG  When compared with ECG of 09-Dec-2024 16:42,  Premature atrial  complexes are no longer Present  Confirmed by ABILIO PAREDES MD LOC: [facility] ([id number]) on 6/4/2025 2:03:57 PM     Streptococcus A Rapid Screen w/Reflex to PCR - Clinic Collect     Status: Normal    Specimen: Throat; Swab   Result Value Ref Range    Group A Strep antigen Negative Negative   CBC with Platelets & Differential     Status: Abnormal    Narrative    The following orders were created for panel order CBC with Platelets & Differential.  Procedure                               Abnormality         Status                     ---------                               -----------         ------                     CBC with platelets and ...[[id number]]  Abnormal            Final result                 Please view results for these tests on the individual orders.       Radiology:  I have personally ordered and preliminarily reviewed the following xray, I have noted no acute infiltrate or consolidation.    XR Chest 2 Views  Result Date: 6/4/2025  EXAM: XR CHEST 2 VIEWS LOCATION: [facility] DATE: 6/4/2025 INDICATION: Acute cough COMPARISON: Chest radiograph 02/08/2024     IMPRESSION: Stable chest radiograph without acute cardiopulmonary abnormality.       At the end of the encounter, I discussed results, diagnosis, medications. Discussed red flags for immediate return to clinic/ER, as well as indications for follow up if no improvement. Patient understood and agreed to plan. Patient was stable for discharge.

## 2025-06-09 ENCOUNTER — HOSPITAL ENCOUNTER (OUTPATIENT)
Facility: CLINIC | Age: 75
Setting detail: OBSERVATION
Discharge: HOME | End: 2025-06-10
Attending: EMERGENCY MEDICINE | Admitting: EMERGENCY MEDICINE
Payer: COMMERCIAL

## 2025-06-09 ENCOUNTER — APPOINTMENT (OUTPATIENT)
Dept: RADIOLOGY | Facility: CLINIC | Age: 75
End: 2025-06-09
Attending: EMERGENCY MEDICINE
Payer: COMMERCIAL

## 2025-06-09 DIAGNOSIS — J44.1 COPD EXACERBATION (H): ICD-10-CM

## 2025-06-09 LAB
ANION GAP SERPL CALCULATED.3IONS-SCNC: 13 MMOL/L (ref 7–15)
ATRIAL RATE - MUSE: 84 BPM
BASE EXCESS BLDV CALC-SCNC: 2.3 MMOL/L (ref -3–3)
BASOPHILS # BLD AUTO: 0.1 10E3/UL (ref 0–0.2)
BASOPHILS NFR BLD AUTO: 1 %
BUN SERPL-MCNC: 11.8 MG/DL (ref 8–23)
CALCIUM SERPL-MCNC: 9.1 MG/DL (ref 8.8–10.4)
CHLORIDE SERPL-SCNC: 103 MMOL/L (ref 98–107)
CREAT SERPL-MCNC: 1.32 MG/DL (ref 0.51–0.95)
D DIMER PPP FEU-MCNC: 0.67 UG/ML FEU (ref 0–0.5)
DIASTOLIC BLOOD PRESSURE - MUSE: 88 MMHG
EGFRCR SERPLBLD CKD-EPI 2021: 42 ML/MIN/1.73M2
EOSINOPHIL # BLD AUTO: 0.2 10E3/UL (ref 0–0.7)
EOSINOPHIL NFR BLD AUTO: 4 %
ERYTHROCYTE [DISTWIDTH] IN BLOOD BY AUTOMATED COUNT: 13.8 % (ref 10–15)
FLUAV RNA SPEC QL NAA+PROBE: NEGATIVE
FLUBV RNA RESP QL NAA+PROBE: NEGATIVE
GLUCOSE SERPL-MCNC: 146 MG/DL (ref 70–99)
HCO3 BLDV-SCNC: 30 MMOL/L (ref 21–28)
HCO3 SERPL-SCNC: 26 MMOL/L (ref 22–29)
HCT VFR BLD AUTO: 46.9 % (ref 35–47)
HGB BLD-MCNC: 15.5 G/DL (ref 11.7–15.7)
HOLD SPECIMEN: NORMAL
IMM GRANULOCYTES # BLD: 0 10E3/UL
IMM GRANULOCYTES NFR BLD: 0 %
INTERPRETATION ECG - MUSE: NORMAL
LYMPHOCYTES # BLD AUTO: 1.2 10E3/UL (ref 0.8–5.3)
LYMPHOCYTES NFR BLD AUTO: 28 %
MAGNESIUM SERPL-MCNC: 2 MG/DL (ref 1.7–2.3)
MCH RBC QN AUTO: 30.3 PG (ref 26.5–33)
MCHC RBC AUTO-ENTMCNC: 33 G/DL (ref 31.5–36.5)
MCV RBC AUTO: 92 FL (ref 78–100)
MONOCYTES # BLD AUTO: 0.3 10E3/UL (ref 0–1.3)
MONOCYTES NFR BLD AUTO: 8 %
NEUTROPHILS # BLD AUTO: 2.5 10E3/UL (ref 1.6–8.3)
NEUTROPHILS NFR BLD AUTO: 60 %
NRBC # BLD AUTO: 0 10E3/UL
NRBC BLD AUTO-RTO: 0 /100
NT-PROBNP SERPL-MCNC: 1571 PG/ML (ref 0–624)
O2/TOTAL GAS SETTING VFR VENT: 28 %
OXYHGB MFR BLDV: 78 % (ref 70–75)
P AXIS - MUSE: 57 DEGREES
PCO2 BLDV: 58 MM HG (ref 40–50)
PH BLDV: 7.33 [PH] (ref 7.32–7.43)
PLATELET # BLD AUTO: 193 10E3/UL (ref 150–450)
PO2 BLDV: 45 MM HG (ref 25–47)
POTASSIUM SERPL-SCNC: 3.6 MMOL/L (ref 3.4–5.3)
PR INTERVAL - MUSE: 166 MS
QRS DURATION - MUSE: 66 MS
QT - MUSE: 388 MS
QTC - MUSE: 487 MS
R AXIS - MUSE: 35 DEGREES
RBC # BLD AUTO: 5.12 10E6/UL (ref 3.8–5.2)
RSV RNA SPEC NAA+PROBE: NEGATIVE
SAO2 % BLDV: 80.9 % (ref 70–75)
SARS-COV-2 RNA RESP QL NAA+PROBE: NEGATIVE
SODIUM SERPL-SCNC: 142 MMOL/L (ref 135–145)
SYSTOLIC BLOOD PRESSURE - MUSE: 151 MMHG
T AXIS - MUSE: 29 DEGREES
TROPONIN T SERPL HS-MCNC: 15 NG/L
TROPONIN T SERPL HS-MCNC: 15 NG/L
TROPONIN T SERPL HS-MCNC: 17 NG/L
VENTRICULAR RATE- MUSE: 95 BPM
WBC # BLD AUTO: 4.2 10E3/UL (ref 4–11)

## 2025-06-09 PROCEDURE — 250N000011 HC RX IP 250 OP 636: Mod: JZ | Performed by: EMERGENCY MEDICINE

## 2025-06-09 PROCEDURE — 87637 SARSCOV2&INF A&B&RSV AMP PRB: CPT | Performed by: EMERGENCY MEDICINE

## 2025-06-09 PROCEDURE — 83735 ASSAY OF MAGNESIUM: CPT | Performed by: EMERGENCY MEDICINE

## 2025-06-09 PROCEDURE — 71045 X-RAY EXAM CHEST 1 VIEW: CPT

## 2025-06-09 PROCEDURE — 36415 COLL VENOUS BLD VENIPUNCTURE: CPT

## 2025-06-09 PROCEDURE — 84484 ASSAY OF TROPONIN QUANT: CPT | Performed by: EMERGENCY MEDICINE

## 2025-06-09 PROCEDURE — 250N000013 HC RX MED GY IP 250 OP 250 PS 637: Performed by: FAMILY MEDICINE

## 2025-06-09 PROCEDURE — 85025 COMPLETE CBC W/AUTO DIFF WBC: CPT | Performed by: EMERGENCY MEDICINE

## 2025-06-09 PROCEDURE — 85379 FIBRIN DEGRADATION QUANT: CPT

## 2025-06-09 PROCEDURE — 250N000013 HC RX MED GY IP 250 OP 250 PS 637

## 2025-06-09 PROCEDURE — 82805 BLOOD GASES W/O2 SATURATION: CPT

## 2025-06-09 PROCEDURE — 250N000009 HC RX 250: Performed by: EMERGENCY MEDICINE

## 2025-06-09 PROCEDURE — 250N000011 HC RX IP 250 OP 636: Performed by: FAMILY MEDICINE

## 2025-06-09 PROCEDURE — 250N000011 HC RX IP 250 OP 636

## 2025-06-09 PROCEDURE — 999N000157 HC STATISTIC RCP TIME EA 10 MIN

## 2025-06-09 PROCEDURE — 99285 EMERGENCY DEPT VISIT HI MDM: CPT | Mod: 25

## 2025-06-09 PROCEDURE — 93005 ELECTROCARDIOGRAM TRACING: CPT | Performed by: EMERGENCY MEDICINE

## 2025-06-09 PROCEDURE — 96365 THER/PROPH/DIAG IV INF INIT: CPT

## 2025-06-09 PROCEDURE — 84484 ASSAY OF TROPONIN QUANT: CPT

## 2025-06-09 PROCEDURE — 96375 TX/PRO/DX INJ NEW DRUG ADDON: CPT

## 2025-06-09 PROCEDURE — 36415 COLL VENOUS BLD VENIPUNCTURE: CPT | Performed by: EMERGENCY MEDICINE

## 2025-06-09 PROCEDURE — 94640 AIRWAY INHALATION TREATMENT: CPT

## 2025-06-09 PROCEDURE — 82435 ASSAY OF BLOOD CHLORIDE: CPT | Performed by: EMERGENCY MEDICINE

## 2025-06-09 PROCEDURE — 83880 ASSAY OF NATRIURETIC PEPTIDE: CPT | Performed by: EMERGENCY MEDICINE

## 2025-06-09 PROCEDURE — 96374 THER/PROPH/DIAG INJ IV PUSH: CPT

## 2025-06-09 PROCEDURE — 258N000003 HC RX IP 258 OP 636: Performed by: FAMILY MEDICINE

## 2025-06-09 PROCEDURE — 96372 THER/PROPH/DIAG INJ SC/IM: CPT

## 2025-06-09 PROCEDURE — 250N000009 HC RX 250

## 2025-06-09 RX ORDER — FLUOXETINE 10 MG/1
10 TABLET, FILM COATED ORAL DAILY
COMMUNITY
Start: 2025-02-17

## 2025-06-09 RX ORDER — OLOPATADINE HYDROCHLORIDE 2 MG/ML
1 SOLUTION OPHTHALMIC AT BEDTIME
Status: DISCONTINUED | OUTPATIENT
Start: 2025-06-09 | End: 2025-06-10 | Stop reason: HOSPADM

## 2025-06-09 RX ORDER — AZITHROMYCIN 500 MG/1
500 TABLET, FILM COATED ORAL DAILY
Status: DISCONTINUED | OUTPATIENT
Start: 2025-06-09 | End: 2025-06-09

## 2025-06-09 RX ORDER — LORATADINE 10 MG/1
10 TABLET ORAL DAILY
Status: DISCONTINUED | OUTPATIENT
Start: 2025-06-10 | End: 2025-06-10 | Stop reason: HOSPADM

## 2025-06-09 RX ORDER — AZITHROMYCIN 500 MG/5ML
500 INJECTION, POWDER, LYOPHILIZED, FOR SOLUTION INTRAVENOUS EVERY 24 HOURS
Status: DISCONTINUED | OUTPATIENT
Start: 2025-06-09 | End: 2025-06-10

## 2025-06-09 RX ORDER — ENOXAPARIN SODIUM 100 MG/ML
40 INJECTION SUBCUTANEOUS EVERY 24 HOURS
Status: DISCONTINUED | OUTPATIENT
Start: 2025-06-09 | End: 2025-06-10 | Stop reason: HOSPADM

## 2025-06-09 RX ORDER — ASPIRIN 81 MG/1
81 TABLET, CHEWABLE ORAL DAILY
Status: DISCONTINUED | OUTPATIENT
Start: 2025-06-10 | End: 2025-06-10 | Stop reason: HOSPADM

## 2025-06-09 RX ORDER — METHYLPREDNISOLONE SODIUM SUCCINATE 125 MG/2ML
125 INJECTION INTRAMUSCULAR; INTRAVENOUS ONCE
Status: COMPLETED | OUTPATIENT
Start: 2025-06-09 | End: 2025-06-09

## 2025-06-09 RX ORDER — NITROGLYCERIN 0.4 MG/1
0.4 TABLET SUBLINGUAL EVERY 5 MIN PRN
Status: DISCONTINUED | OUTPATIENT
Start: 2025-06-09 | End: 2025-06-10 | Stop reason: HOSPADM

## 2025-06-09 RX ORDER — LIDOCAINE 40 MG/G
CREAM TOPICAL
Status: DISCONTINUED | OUTPATIENT
Start: 2025-06-09 | End: 2025-06-10 | Stop reason: HOSPADM

## 2025-06-09 RX ORDER — LEVOTHYROXINE SODIUM 50 UG/1
50 TABLET ORAL 3 TIMES DAILY
Status: DISCONTINUED | OUTPATIENT
Start: 2025-06-09 | End: 2025-06-10 | Stop reason: HOSPADM

## 2025-06-09 RX ORDER — IPRATROPIUM BROMIDE AND ALBUTEROL SULFATE 2.5; .5 MG/3ML; MG/3ML
3 SOLUTION RESPIRATORY (INHALATION)
Status: DISCONTINUED | OUTPATIENT
Start: 2025-06-09 | End: 2025-06-10 | Stop reason: HOSPADM

## 2025-06-09 RX ORDER — FLUOXETINE 10 MG/1
10 CAPSULE ORAL DAILY
Status: DISCONTINUED | OUTPATIENT
Start: 2025-06-09 | End: 2025-06-10 | Stop reason: HOSPADM

## 2025-06-09 RX ORDER — POLYETHYLENE GLYCOL 3350 17 G/17G
17 POWDER, FOR SOLUTION ORAL DAILY
Status: DISCONTINUED | OUTPATIENT
Start: 2025-06-09 | End: 2025-06-10 | Stop reason: HOSPADM

## 2025-06-09 RX ORDER — PROCHLORPERAZINE MALEATE 5 MG/1
5 TABLET ORAL EVERY 6 HOURS PRN
Status: DISCONTINUED | OUTPATIENT
Start: 2025-06-09 | End: 2025-06-10 | Stop reason: HOSPADM

## 2025-06-09 RX ORDER — OLOPATADINE HYDROCHLORIDE 2 MG/ML
1 SOLUTION OPHTHALMIC AT BEDTIME
COMMUNITY
Start: 2025-05-09

## 2025-06-09 RX ORDER — ACETAMINOPHEN 650 MG/1
650 SUPPOSITORY RECTAL EVERY 4 HOURS PRN
Status: DISCONTINUED | OUTPATIENT
Start: 2025-06-09 | End: 2025-06-10 | Stop reason: HOSPADM

## 2025-06-09 RX ORDER — AZITHROMYCIN 200 MG/5ML
500 POWDER, FOR SUSPENSION ORAL
Status: DISCONTINUED | OUTPATIENT
Start: 2025-06-09 | End: 2025-06-09

## 2025-06-09 RX ORDER — ALBUTEROL SULFATE 0.83 MG/ML
5 SOLUTION RESPIRATORY (INHALATION) ONCE
Status: COMPLETED | OUTPATIENT
Start: 2025-06-09 | End: 2025-06-09

## 2025-06-09 RX ORDER — ACETAMINOPHEN 325 MG/1
650 TABLET ORAL EVERY 4 HOURS PRN
Status: DISCONTINUED | OUTPATIENT
Start: 2025-06-09 | End: 2025-06-10 | Stop reason: HOSPADM

## 2025-06-09 RX ORDER — CALCIUM CARBONATE 500 MG/1
1000 TABLET, CHEWABLE ORAL 4 TIMES DAILY PRN
Status: DISCONTINUED | OUTPATIENT
Start: 2025-06-09 | End: 2025-06-10 | Stop reason: HOSPADM

## 2025-06-09 RX ORDER — PREDNISONE 20 MG/1
40 TABLET ORAL DAILY
Status: DISCONTINUED | OUTPATIENT
Start: 2025-06-10 | End: 2025-06-10

## 2025-06-09 RX ADMIN — AZITHROMYCIN MONOHYDRATE 500 MG: 500 INJECTION, POWDER, LYOPHILIZED, FOR SOLUTION INTRAVENOUS at 17:55

## 2025-06-09 RX ADMIN — ALBUTEROL SULFATE 5 MG: 2.5 SOLUTION RESPIRATORY (INHALATION) at 14:34

## 2025-06-09 RX ADMIN — LEVOTHYROXINE SODIUM 50 MCG: 0.05 TABLET ORAL at 20:02

## 2025-06-09 RX ADMIN — METHYLPREDNISOLONE SODIUM SUCCINATE 125 MG: 125 INJECTION, POWDER, FOR SOLUTION INTRAMUSCULAR; INTRAVENOUS at 12:29

## 2025-06-09 RX ADMIN — IPRATROPIUM BROMIDE AND ALBUTEROL SULFATE 3 ML: .5; 3 SOLUTION RESPIRATORY (INHALATION) at 16:30

## 2025-06-09 RX ADMIN — ENOXAPARIN SODIUM 40 MG: 40 INJECTION SUBCUTANEOUS at 16:33

## 2025-06-09 RX ADMIN — FLUOXETINE HYDROCHLORIDE 10 MG: 10 CAPSULE ORAL at 16:34

## 2025-06-09 RX ADMIN — IPRATROPIUM BROMIDE AND ALBUTEROL SULFATE 3 ML: .5; 3 SOLUTION RESPIRATORY (INHALATION) at 20:50

## 2025-06-09 RX ADMIN — ACETAMINOPHEN 650 MG: 325 TABLET ORAL at 16:34

## 2025-06-09 RX ADMIN — Medication 12.5 MG: at 16:50

## 2025-06-09 RX ADMIN — POLYETHYLENE GLYCOL 3350 17 G: 17 POWDER, FOR SOLUTION ORAL at 16:33

## 2025-06-09 RX ADMIN — OLOPATADINE HYDROCHLORIDE 1 DROP: 2 SOLUTION OPHTHALMIC at 22:24

## 2025-06-09 RX ADMIN — LEVOTHYROXINE SODIUM 50 MCG: 0.05 TABLET ORAL at 17:23

## 2025-06-09 ASSESSMENT — ACTIVITIES OF DAILY LIVING (ADL)
ADLS_ACUITY_SCORE: 41
ADLS_ACUITY_SCORE: 41
DEPENDENT_IADLS:: CLEANING;COOKING;LAUNDRY;SHOPPING;MEAL PREPARATION;MEDICATION MANAGEMENT;TRANSPORTATION
ADLS_ACUITY_SCORE: 41

## 2025-06-09 ASSESSMENT — COLUMBIA-SUICIDE SEVERITY RATING SCALE - C-SSRS
6. HAVE YOU EVER DONE ANYTHING, STARTED TO DO ANYTHING, OR PREPARED TO DO ANYTHING TO END YOUR LIFE?: NO
1. IN THE PAST MONTH, HAVE YOU WISHED YOU WERE DEAD OR WISHED YOU COULD GO TO SLEEP AND NOT WAKE UP?: NO
2. HAVE YOU ACTUALLY HAD ANY THOUGHTS OF KILLING YOURSELF IN THE PAST MONTH?: NO

## 2025-06-09 NOTE — H&P
[facility]    History and Physical - Hospitalist Service       Date of Admission:  6/9/2025    Assessment & Plan      Bárbara Perez is a 74 year old female admitted on 6/9/2025. She has a history of COPD, T2DM, GERD, HTN, hypothyroidism, CAD s/p multiple cardiac interventions, and multiple listed allergies. She presented to the ER with escalating SOB and is admitted for suspected COPD exacerbation.    Acute hypoxic respiratory failure  COPD exacerbation  Presented to urgent care on 6/4/25 for cold an asthma symptoms, had been advised to present to the ED which she declined. SOB escalated prompting her to contact EMS and present to the ED today. Started on 5 LPM supplemental oxygen and given nitroglycerin en route with EMS. Not on supplemental O2 at baseline. CXR unremarkable. Will admit for suspected COPD exacerbation. Workup including potential CT PE limited by patient's extensive allergy list including to contrast dyes.  - Admit to inpatient  - Supplemental O2, wean as able; titrate 88-92%  - DuoNebs q4hr PRN  - Continue prednisone burst  - Start azithromycin  - Add on D-dimer; if positive, proceed with V/Q scan    Chest pain, resolved  S/p nitroglycerin en route with EMS. Chest pain resolved on admission. Does have notable cardiac history with a lesion where recommendation had included stent but patient reports this was deferred due to allergy to metal. EKG on presentation without acute ischemic changes. Negative troponins. Differential includes possible sequelae of URI symptoms, anginal chest pain, GERD, MSK. Given unremarkable cardiac workup thus far, resolution of chest pain, and reproducible chest pain to palpation, suspect sequelae of URI symptoms vs MSK. Will continue to monitor closely.  - Cardiac tele   - repeat troponin  - D-dimer for thromboembolic r/o as above    History of CAD  S/p PTCA of the vein graft to obtuse marginal branch  Severe stenosis of the right coronary  "artery  Patient does have a notable cardiac history including blockages. Per last annual physical, she reported that she \"was told by the [facility] that she is allergic to stents - had a stroke after stent was placed.\" She had been advised to undergo surgery for her blockages but declined. Last visit with cardiology on file appears to have been with Dr. Sol in 2020 though may be more recent visits at [facility]. Last echo on file from 2024: EF 60-65%, mild-moderate mitral valve regurgitation, mild-moderate tricuspid valve regurgitation.   - PRN nitroglycerin  - Pursue further cardiac workup if chest pain returns  - Repeat troponin   -continue PTA ASA    T2DM, well-controlled  A1c 5.8% on 5/9/25. Per last PCP note, glipizide was stopped due to reported jitteriness and given well-controlled blood sugar status. Glucose 142 on presentation. Per med rec, patient has continued to take glipizide with last dose listed as being taken 6/8/25.  - hold PTA glipizide  - AM BMP    Hypothyroidism  - PTA levothyroxine 50 mcg TID    Essential HTN  -continue PTA metoprolol    Seasonal allergies   -continue PTA olopatadine and loratadine       Diet: Combination Diet Regular Diet Adult  DVT Prophylaxis: Enoxaparin (Lovenox) SQ  Talavera Catheter: Not present  Fluids: POI  Lines: None     Cardiac Monitoring: ACTIVE order. Indication: Chest pain/ ACS rule out (24 hours)  Code Status: Full Code    Clinically Significant Risk Factors Present on Admission                 # Drug Induced Platelet Defect: home medication list includes an antiplatelet medication   # Hypertension: Noted on problem list           # Obesity: Estimated body mass index is 37.2 kg/m  as calculated from the following:    Height as of this encounter: 1.6 m (5' 3\").    Weight as of this encounter: 95.3 kg (210 lb).       # Asthma: noted on problem list        Disposition Plan      Expected Discharge Date: 06/11/2025                  Resident/Fellow Attestation   I, " Rudy Darby DO, was present with the medical/ALEJANDRA student Fatmata Grajeda MS-4 who participated in the service and in the documentation of the note.  I have verified the history and personally performed the physical exam and medical decision making.  I agree with the assessment and plan of care as documented in the note.      Rudy Darby DO  PGY3   Rudy Darby DO  Hospitalist Service  [facility]  Securely message with [employer] (more info)  Text page via [facility] Paging/Directory   ______________________________________________________________________    Chief Complaint   SOB, chest discomfort    History is obtained from the patient    History of Present Illness   Bárbara Perez is a 73 year old F with complicated PMH significant for stroke with left hemiparesis, CAD s/p CABG 2010, s/p PTCA in 2017 and 2020, and NSTEMI in 2021, HTN, dyslipidemia, DM2,  Asthma-COPD overlap syndrome, hypothyroidism, GERD, chronic back pain, and cigarette smoking who presents for SOB and chest pain.     Early last week, patient was experiencing more cough than normal, orthopnea, chest pressure, dizziness, and nausea. Attributed this to poor air quality. Went in to urgent care for this, was found to be hypoxic and wheezing on exam. Patient declined further work-up for anginal symptoms.     Today, patient experienced chest pain which prompted her to come to the ED. She received nitroglycerin en route to the ED, which improved her symptoms. On exam, was found to be wheezing. Given nebs, steroids, and 5L O2. Troponin elevated, but near her baseline.     She notes that this chest pain is similar to what she has experienced before with heart attacks. She explains that she has chest discomfort regularly d/t COPD, but that this is different. She also endorses pain in bilateral shoulders radiating into chest, notes that she had a R rotator cuff injury recently. Per chart review, patient smokes.     Denies fever.      Past Medical History    Past Medical History:   Diagnosis Date    Asthma     CAD (coronary artery disease)     Chest pain     COPD (chronic obstructive pulmonary disease) (H)     Diabetes mellitus (H)     GERD (gastroesophageal reflux disease)     Heart attack (H)     HTN (hypertension)     Hypothyroidism     Psoriasis     Seasonal allergies     Sternum pain     Following Cardiac Bypass       Past Surgical History   Past Surgical History:   Procedure Laterality Date    APPENDECTOMY      CARDIAC CATHETERIZATION  01/26/2017    CARDIAC CATHETERIZATION N/A 1/26/2017    Procedure: Coronary Angiogram;  Surgeon: Arsen Anderson MD;  Location: [facility] Cath Lab;  Service:     CARDIAC SURGERY  2008    Triple Bypass    CV CORONARY ANGIOGRAM N/A 9/7/2020    Procedure: Coronary Angiogram;  Surgeon: Adriana Schultz MD;  Location: [facility] Cath Lab;  Service: Cardiology    CV LEFT HEART CATHETERIZATION WITHOUT LEFT VENTRICULOGRAM Left 9/7/2020    Procedure: Left Heart Catheterization Without Left Ventriculogram;  Surgeon: Adriana Schulzt MD;  Location: [facility] Cath Lab;  Service: Cardiology    HYSTERECTOMY      age 30's    WRIST SURGERY Left     Ligament Surgery       Prior to Admission Medications   Prior to Admission Medications   Prescriptions Last Dose Informant Patient Reported? Taking?   FLUoxetine (PROZAC) 10 MG tablet 6/8/2025 Morning  Yes Yes   Sig: Take 10 mg by mouth daily.   aspirin 81 mg chewable tablet 6/9/2025 Morning  Yes Yes   Sig: [ASPIRIN 81 MG CHEWABLE TABLET] Chew 81 mg daily.    blood glucose test (ACCU-CHEK ACTIVE TEST) strips   No No   Sig: [BLOOD GLUCOSE TEST (ACCU-CHEK ACTIVE TEST) STRIPS] Use 1 each As Directed as needed. Dispense brand per patient's insurance at pharmacy discretion.   blood glucose test (GLUCOSE BLOOD) strips   Yes No   Sig: [BLOOD GLUCOSE TEST (GLUCOSE BLOOD) STRIPS] Test once daily   compressor, for nebulizer Mellissa   No No   Sig: [COMPRESSOR, FOR NEBULIZER MELLISSA]  To use with albuterol as needed for wheezing and shortness of breath   fluticasone (FLONASE) 50 mcg/actuation nasal spray 6/8/2025 Bedtime  Yes Yes   Sig: Spray 2 sprays in nostril 2 times daily.   generic lancets (ACCU-CHEK MULTICLIX LANCET)   Yes No   Sig: [GENERIC LANCETS (ACCU-CHEK MULTICLIX LANCET)] TESTING ONCE DAILY   glipiZIDE (GLUCOTROL) 5 MG tablet 6/8/2025 Evening  Yes Yes   Sig: Take 1.25 mg by mouth 2 times daily (before meals).   ipratropium - albuterol 0.5 mg/2.5 mg/3 mL (DUONEB) 0.5-2.5 (3) MG/3ML neb solution Unknown  No Yes   Sig: Take 1 vial (3 mLs) by nebulization every 6 hours as needed for shortness of breath, wheezing or cough   ipratropium-albuterol (DUO-NEB) 0.5-2.5 mg/3 mL nebulizer 6/8/2025 Evening  Yes Yes   Sig: Take 3 mLs by nebulization 3 times daily.   levothyroxine (SYNTHROID, LEVOTHROID) 50 MCG tablet 6/9/2025 Morning  Yes Yes   Sig: [LEVOTHYROXINE (SYNTHROID, LEVOTHROID) 50 MCG TABLET] Take 50 mcg by mouth 3 (three) times a day.    loratadine (CLARITIN) 10 mg tablet 6/8/2025 Morning  Yes Yes   Sig: [LORATADINE (CLARITIN) 10 MG TABLET] Take 10 mg by mouth daily.   metoprolol tartrate (LOPRESSOR) 25 MG tablet 6/8/2025 Bedtime  No Yes   Sig: [METOPROLOL TARTRATE (LOPRESSOR) 25 MG TABLET] Take 0.5 tablets (12.5 mg total) by mouth 2 (two) times a day.   nitroglycerin (NITROSTAT) 0.4 MG SL tablet Unknown  No Yes   Sig: [NITROGLYCERIN (NITROSTAT) 0.4 MG SL TABLET] Place 1 tablet (0.4 mg total) under the tongue every 5 (five) minutes as needed for chest pain.   olopatadine (PATADAY) 0.2 % ophthalmic solution 6/8/2025 Bedtime  Yes Yes   Sig: Place 1 drop into both eyes at bedtime.      Facility-Administered Medications: None        Review of Systems    The 10 point Review of Systems is negative other than noted in the HPI or here.   ROS reviewed, see HPI for pertinent positives and negatives.  Rudy Darby, DO      Social History   I have reviewed this patient's social history and updated  it with pertinent information if needed.  Social History     Tobacco Use    Smoking status: Some Days     Current packs/day: 1.00     Average packs/day: 1 pack/day for 30.0 years (30.0 ttl pk-yrs)     Types: Cigarettes    Smokeless tobacco: Never    Tobacco comments:     Smoking Cessation Packet provided   Substance Use Topics    Alcohol use: No    Drug use: No       Allergies   Allergies   Allergen Reactions    Atorvastatin Shortness Of Breath and Swelling     Was previously deleted but on current encounter (6/20/17), patient told me to add as an allergy.     Cefdinir Shortness Of Breath and Itching    Dye [External Allergen Needs Reconciliation - See Comment] Other (See Comments)     Allergy to all dyes.  Cannot take any medications with dyes or color. Causes rapid heartrate, sweating.  Patient unsure of other symptoms but states they are severe.    Ezetimibe Shortness Of Breath and Swelling     Spoke with patient 9/8/20, stated reaction was shoulder pain, and she never experienced shortness of breath from zetia.     Latex Anaphylaxis    Penicillin V Shortness Of Breath and Rash    Amoxicillin     Cetirizine Unknown     unknown    Chocolate Laxative [Senna] Unknown    Codeine Unknown    Doxycycline      Patient states that she is unable to take the doxycycline prescribed due to the fact that it has color, she is allergic to ALL dyes.      Egg Derived [Chicken-Derived Products (Egg)] Unknown    Fenofibrate Unknown    Influenza Virus Vaccines [Influenza Vaccines] Unknown    Iodine Unknown    Ioxaglate Sodium [Ioxaglate] Itching    Latex Unknown    Oxycodone Unknown    Peanut Oil Unknown    Tizanidine Hallucination    Toradol [Ketorolac] Unknown     Ulcers, takes aspirin daily at home and Aleve as needed        Physical Exam   Vital Signs: Temp: 97.9  F (36.6  C) Temp src: Oral BP: (!) 170/79 Pulse: 115   Resp: 23 SpO2: 94 % O2 Device: Nasal cannula Oxygen Delivery: 2 LPM  Weight: 210 lbs 0 oz    Constitutional:  awake, alert, cooperative, and appears stated age; able to roll over and scoot  Chest: Tenderness over lateral chest, bilaterally - inferior to coracoid processes   Respiratory: no increased work of breathing, moderate air exchange, and no crackles or wheezing  Cardiovascular: regular rate and rhythm, no murmur noted and no edema, chest pain reproducible to palpation left and right chest wall  GI: Scar from previous hysterectomy, soft, non-distended, non-tender, no masses palpated    Data     I have personally reviewed the following data over the past 24 hrs:    4.2  \   15.5   / 193     142 103 11.8 /  146 (H)   3.6 26 1.32 (H) \     Trop: 17 (H) BNP: 1,571 (H)       Imaging results reviewed over the past 24 hrs:   Recent Results (from the past 24 hours)   XR Chest Port 1 View    Narrative    EXAM: XR CHEST PORT 1 VIEW  LOCATION: [facility]  DATE: 6/9/2025    INDICATION: Shortness of breath  COMPARISON: PA and lateral views of the chest 6/4/2025      Impression    IMPRESSION:     Cardiac silhouette is unchanged in size. The left ventricular apex remains indistinct due to adjacent mediastinal fat. There are findings of previous median sternotomy and coronary revascularization. The vascular pedicle is normal.    Symmetric lung inflation. There are no airspace or interstitial lung opacities. Diaphragm curvature is preserved. No visible pleural fluid. No pneumothorax.    Intact and aligned sternal wires. Bilateral shoulder osteoarthrosis.

## 2025-06-09 NOTE — ED NOTES
Bed: [facility]-11  Expected date:   Expected time:   Means of arrival:   Comments:  SPF 75yo chest pain  Given NTG and ASA

## 2025-06-09 NOTE — CONSULTS
Care Management Initial Consult      General Information  Assessment completed with: Patient,    Type of CM/SW Visit: Initial Assessment  Primary Care Provider verified and updated as needed: Yes   Readmission within the last 30 days: no previous admission in last 30 days   Reason for Consult: discharge planning, health care directive, transportation  Advance Care Planning: Advance Care Planning Reviewed: no concerns identified        Communication Assessment  Patient's communication style: spoken language (English or Bilingual)        Living Environment:   People in home: child(darci), adult  Son Ayden resides in the apartment with pt  Current living Arrangements: apartment      Able to return to prior arrangements: yes     Family/Social Support:  Care provided by: child(darci), other (see comments) (PCA/homemaker services for a total of ~20 hrs/day)  Provides care for: no one, unable/limited ability to care for self  Marital Status: Single  Support system: Children, Friend, PCA          Description of Support System: Involved, Supportive    Support Assessment: Adequate family and caregiver support, Adequate social supports    Current Resources:   Patient receiving home care services: No  Community Resources: County Worker, County Programs, PCA, Other (see comment) (Elderly waiver)  Equipment currently used at home: grab bar, toilet, grab bar, tub/shower, glucometer, shower chair, hospital bed, walker, rolling  Supplies currently used at home: Nebulizer tubing, Diabetic Supplies    Employment/Financial:  Employment Status: disabled     Financial Concerns: none   Referral to Financial Worker: No     Does the patient's insurance plan have a 3 day qualifying hospital stay waiver?  No    Lifestyle & Psychosocial Needs:  Social Drivers of Health     Food Insecurity: No Food Insecurity (5/9/2025)    [facility]    Food Insecurity     Do you worry your food will run out  before you are able to buy more?: 1   Depression: Not at risk (5/9/2025)    Received from [facility]    PHQ-2     PHQ-2 TOTAL SCORE: 0   Housing Stability: Low Risk  (5/9/2025)    [facility]    Housing Stability     What is your housing situation today?: 1   Tobacco Use: High Risk (6/4/2025)    Patient History     Smoking Tobacco Use: Some Days     Smokeless Tobacco Use: Never     Passive Exposure: Not on file   Financial Resource Strain: Low Risk  (5/9/2025)    Received from [facility]    Financial Resource Strain     Difficulty of Paying Living Expenses: 3     Difficulty of Paying Living Expenses: Not on file   Alcohol Use: Not on file (2/21/2023)   Transportation Needs: No Transportation Needs (5/9/2025)    [facility]    Transportation Needs     Does lack of transportation keep you from medical appointments?: 1     Does lack of transportation keep you from work, meetings or getting things that you need?: 1   Physical Activity: Insufficiently Active (3/15/2021)    Received from [facility]    Exercise Vital Sign     Days of Exercise per Week: 2 days     Minutes of Exercise per Session: 20 min   Interpersonal Safety: Not on file   Stress: Stress Concern Present (3/15/2021)    Received from [facility]    [employer] [place] [employer] - Occupational Stress Questionnaire     Feeling of Stress : To some extent   Social Connections: Socially Integrated (5/9/2025)    Received from [facility]    Social Connections     Do you often feel lonely or isolated from those around you?: 0   Health Literacy: Not on file     Functional Status:  Prior to admission patient needed assistance:   Dependent ADLs:: Ambulation-walker, Bathing, Dressing, Grooming, Toileting  Dependent IADLs:: Cleaning, Cooking, Laundry, Shopping, Meal  "Preparation, Medication Management, Transportation  Assessment of Functional Status: Not at  functional baseline    Mental Health Status:  Mental Health Status: No Current Concerns       Chemical Dependency Status:  Chemical Dependency Status: No Current Concerns           Values/Beliefs:  Spiritual, Cultural Beliefs, Taoism Practices, Values that affect care: no             Discussed  Partnership in Safe Discharge Planning  document with patient/family: Yes    Additional Information:  Writer met with pt to introduce Care Management(CM), obtain an initial assessment, and offer discharge support. Pt resides in an apartment with her son Ayden. Pt states having PCA and Homemaking services through the [facility] at an approximate total of 20 hrs/day. Pt states no concerns and anticipates returning home when improved medically. No discharge/CM related concerns identified.    6/9 per NILAM Anne.-\"74 year old female presents to the Emergency Department for evaluation of shortness of breath chest pain and hypoxia.  Patient has history known coronary artery disease status post multiple cardiac interventions in the past as well as COPD not O2 dependent.  Presented to urgent care last week with what she describes as a cold and asthma symptoms.  Was told that she had a low oxygen at that time and should go to the hospital to be admitted.  She declined.  Shortness of breath has escalated throughout the weekend and ultimately contacted EMS and was transported the emergency department.  She was placed on 5 L oxygen en route to the hospital she received nitroglycerin for chest pain shortness of breath.  At the time my assessment shortly after arrival the patient was feeling improved her chest pain had resolved.  On examination patient did have some faint end expiratory wheezing but overall had good air entry throughout was not in respiratory distress.  She has known coronary artery disease that they are monitoring.  " "Apparently she has some type of allergy to metal so with her last cardiology evaluation they were hesitant to stent known cardiac lesion that was 4 years ago per her report.  No chest pain symptoms currently.  Have initiated broad workup in the emergency department.  Patient has multiple allergies including intravenous contrast dye.  Given her chest pain has resolved and her ECG is nonischemic we are proceeding with laboratory testing chest x-ray imaging but we continue to monitor the patient.  Some component of the patient's symptoms are what sounds like upper respiratory illness and wheezing that she had presented to urgent care with would favor COPD exacerbation however certainly patient is at high risk for cardiac also considerations for thromboembolic although overall this seems less likely.  Evaluation for thromboembolic complicated by patient's contrast allergy.  Plan to reassess after labs and x-ray imaging.     Overall reassuring workup chest x-ray with no acute findings.  Slight uptick in troponin but not to rule in level.  No current chest pain.  ECG nonischemic.  Favor COPD as acute source of patient's symptomatology with associated COPD exacerbation.  Overall given her continued O2 requirement I do think admission to the hospital is indicated that she will likely need some additional cardiac workup to as she is a high risk cardiac patient with some slight uptick in her troponin.  Will continue to monitor's closely.  Currently on 2 L nasal cannula trended down from 5 at arrival.  Has received steroids.  Case discussed with hospitalist service.  Updated patient and family on test results and plan of care.\"    Next Steps:   PT & OT Consults pending. Anticipate improvement and return to home with continued PCA and son support as needed. CM to follow-up on therapy consults and assist with service coordination needs as indicated. Family/friend to transport pt home at time of discharge.    Yuval Garcia, " RN

## 2025-06-09 NOTE — ED TRIAGE NOTES
"  Patient presents to the ED with chest pain and shortness of breath that got \"worse than normal\" today. Did receive 324 ASA by EMS as well as nitroglycerin which brought her pain from an 8 to a 5.Patient has a cardiac history and normally goes to [facility] per patient report. Was seen at the urgent care last week for her cough and asthma, was told her had low oxygen levels and needed to go to the hospital. She told them she had an apt with [facility] coming up and would go see [facility]. Oxygen levels low today by EMS. On 5L nasal cannula.    "

## 2025-06-09 NOTE — PROGRESS NOTES
Pt remains on O2 2 LPM NC, SpO2 95%, BS diminished, expiratory wheezing, aerations increased post neb, breathing improved post neb per pt. Pt reports  jittering post neb. Pt received Duoneb qid. RT following.     David Duval, RT

## 2025-06-09 NOTE — PHARMACY-ADMISSION MEDICATION HISTORY
Pharmacist Admission Medication History    Admission medication history is complete. The information provided in this note is only as accurate as the sources available at the time of the update.    Information Source(s): Patient and CareEverywhere/SureScripts via in-person    Pertinent Information: Patient reports blood sugars have been lower so has reduced glipizide to 1/4 tablet BID.     Changes made to PTA medication list:  Added: fluoxetine, olopatadine  Deleted: Advair, albuterol neb, plavix, prednisone, simethicone, tizanidine, triamcinolone  Changed: glipizide dose, duoneb freq    Allergies reviewed with patient and updates made in EHR: yes    Medication History Completed By: Lan Stephens RPH 6/9/2025 2:03 PM    PTA Med List   Medication Sig Last Dose/Taking    aspirin 81 mg chewable tablet [ASPIRIN 81 MG CHEWABLE TABLET] Chew 81 mg daily.  6/9/2025 Morning    FLUoxetine (PROZAC) 10 MG tablet Take 10 mg by mouth daily. 6/8/2025 Morning    fluticasone (FLONASE) 50 mcg/actuation nasal spray Spray 2 sprays in nostril 2 times daily. 6/8/2025 Bedtime    glipiZIDE (GLUCOTROL) 5 MG tablet Take 1.25 mg by mouth 2 times daily (before meals). 6/8/2025 Evening    ipratropium - albuterol 0.5 mg/2.5 mg/3 mL (DUONEB) 0.5-2.5 (3) MG/3ML neb solution Take 1 vial (3 mLs) by nebulization every 6 hours as needed for shortness of breath, wheezing or cough Unknown    ipratropium-albuterol (DUO-NEB) 0.5-2.5 mg/3 mL nebulizer Take 3 mLs by nebulization 3 times daily. 6/8/2025 Evening    levothyroxine (SYNTHROID, LEVOTHROID) 50 MCG tablet [LEVOTHYROXINE (SYNTHROID, LEVOTHROID) 50 MCG TABLET] Take 50 mcg by mouth 3 (three) times a day.  6/9/2025 Morning    loratadine (CLARITIN) 10 mg tablet [LORATADINE (CLARITIN) 10 MG TABLET] Take 10 mg by mouth daily. 6/8/2025 Morning    metoprolol tartrate (LOPRESSOR) 25 MG tablet [METOPROLOL TARTRATE (LOPRESSOR) 25 MG TABLET] Take 0.5 tablets (12.5 mg total) by mouth 2 (two) times a day.  6/8/2025 Bedtime    nitroglycerin (NITROSTAT) 0.4 MG SL tablet [NITROGLYCERIN (NITROSTAT) 0.4 MG SL TABLET] Place 1 tablet (0.4 mg total) under the tongue every 5 (five) minutes as needed for chest pain. Unknown    olopatadine (PATADAY) 0.2 % ophthalmic solution Place 1 drop into both eyes at bedtime. 6/8/2025 Bedtime

## 2025-06-09 NOTE — PROGRESS NOTES
"[facility] ED Handoff Report    ED Chief Complaint: shortness of breath, chest pain, hypoxia    ED Diagnosis:  (J44.1) COPD exacerbation (H)  Comment: on 2L nasal cannula  Plan: Admit for IV steroids, nebs, monitoring oxygen levels       PMH:    Past Medical History:   Diagnosis Date    Asthma     CAD (coronary artery disease)     Chest pain     COPD (chronic obstructive pulmonary disease) (H)     Diabetes mellitus (H)     GERD (gastroesophageal reflux disease)     Heart attack (H)     HTN (hypertension)     Hypothyroidism     Psoriasis     Seasonal allergies     Sternum pain     Following Cardiac Bypass        Code Status:  Full Code     Falls Risk: Yes Band: Applied    Current Living Situation/Residence: lives in an apartment     Elimination Status: Continent: Yes, purewick placed for shortness of breath/dyspnea    Activity Level: SBA    Patient's Preferred Language:  English     Needed: No    Vital Signs:  BP (!) 170/79 (BP Location: Left arm, Patient Position: Semi-Oseguera's, Cuff Size: Adult Large)   Pulse 115   Temp 97.9  F (36.6  C) (Oral)   Resp 23   Ht 1.6 m (5' 3\")   Wt 95.3 kg (210 lb)   SpO2 94%   BMI 37.20 kg/m       Cardiac Rhythm: ST    Pain Score: 0/10    Is the Patient Confused:  No    Assessment and Plan of Care:  Admit for COPD exacerbation. IV steroids/nebs    Tests Performed: Done: Labs and Imaging    Family Dynamics/Concerns: No    Belongings Checklist Done and Signed by Patient: N/A      RN: Kaur Mittal RN   6/9/2025 4:04 PM       "

## 2025-06-09 NOTE — PROGRESS NOTES
Brief Progress Note  -age adjusted D-dimer reassuring. - VTE unlikely.  -troponin repeat decreased to 15.  Rudy Darby DO

## 2025-06-09 NOTE — PROGRESS NOTES
Patient told writer that she can not take azithromycin tablet because it was red and she has an allergy to all dyes in pills. Notified pharmacy who changed formulary to liquid.   OR RN

## 2025-06-09 NOTE — ED PROVIDER NOTES
EMERGENCY DEPARTMENT ENCOUNTER      NAME: Bárbara Perez  AGE: 74 year old female  YOB: 1950  MRN: [id number]  EVALUATION DATE & TIME: 6/9/2025 11:43 AM    PCP: Tamara Sweet    ED PROVIDER: Keegan Boone      Chief Complaint   Patient presents with    Shortness of Breath    Chest Pain     FINAL IMPRESSION:  1. COPD exacerbation (H)        ED COURSE & MEDICAL DECISION MAKING:    Pertinent Labs & Imaging studies reviewed. (See chart for details)  74 year old female presents to the Emergency Department for evaluation of shortness of breath chest pain and hypoxia.  Patient has history known coronary artery disease status post multiple cardiac interventions in the past as well as COPD not O2 dependent.  Presented to urgent care last week with what she describes as a cold and asthma symptoms.  Was told that she had a low oxygen at that time and should go to the hospital to be admitted.  She declined.  Shortness of breath has escalated throughout the weekend and ultimately contacted EMS and was transported the emergency department.  She was placed on 5 L oxygen en route to the hospital she received nitroglycerin for chest pain shortness of breath.  At the time my assessment shortly after arrival the patient was feeling improved her chest pain had resolved.  On examination patient did have some faint end expiratory wheezing but overall had good air entry throughout was not in respiratory distress.  She has known coronary artery disease that they are monitoring.  Apparently she has some type of allergy to metal so with her last cardiology evaluation they were hesitant to stent known cardiac lesion that was 4 years ago per her report.  No chest pain symptoms currently.  Have initiated broad workup in the emergency department.  Patient has multiple allergies including intravenous contrast dye.  Given her chest pain has resolved and her ECG is nonischemic we are proceeding with laboratory  testing chest x-ray imaging but we continue to monitor the patient.  Some component of the patient's symptoms are what sounds like upper respiratory illness and wheezing that she had presented to urgent care with would favor COPD exacerbation however certainly patient is at high risk for cardiac also considerations for thromboembolic although overall this seems less likely.  Evaluation for thromboembolic complicated by patient's contrast allergy.  Plan to reassess after labs and x-ray imaging.    Overall reassuring workup chest x-ray with no acute findings.  Slight uptick in troponin but not to rule in level.  No current chest pain.  ECG nonischemic.  Favor COPD as acute source of patient's symptomatology with associated COPD exacerbation.  Overall given her continued O2 requirement I do think admission to the hospital is indicated that she will likely need some additional cardiac workup to as she is a high risk cardiac patient with some slight uptick in her troponin.  Will continue to monitor's closely.  Currently on 2 L nasal cannula trended down from 5 at arrival.  Has received steroids.  Case discussed with hospitalist service.  Updated patient and family on test results and plan of care.       Medical Decision Making  Admit.    MIPS (CTPE, Dental pain, Talavera, Sinusitis, Asthma/COPD, Head Trauma): Not Applicable    SEPSIS: None        At the conclusion of the encounter I discussed the results of all of the tests and the disposition. The questions were answered. The patient or family acknowledged understanding and was agreeable with the care plan.     MEDICATIONS GIVEN IN THE EMERGENCY:  Medications   methylPREDNISolone Na Suc (solu-MEDROL) injection 125 mg (125 mg Intravenous $Given 6/9/25 1229)   albuterol (PROVENTIL) neb solution 5 mg (5 mg Nebulization $Given 6/9/25 7132)       NEW PRESCRIPTIONS STARTED AT TODAY'S ER VISIT  New Prescriptions    No medications on file     Modified Medications    No medications  "on file       =================================================================    HPI    Patient information was obtained from: patient    Use of : N/A         Bárbara Perez is a 74 year old female with a pertinent history of stage 3a CKD, mitral and tricuspid valve insufficiency, acute coronary ischmic heart disease, COPD, asthma, hypertension, hyperlipidemia, s/p CABG x3, NSTEMI, type II diabetes, who presents to this ED via EMS for evaluation of shortness of breath and chest pain.    Per chart review, patient was seen on 6/4/2025 at [facility] for cough, fever, chills, chest pain, wheezing, and shortness of breath. Patient had orthopnea, chest pressure with dizziness, nausea but no overt vomiting, and bouts of diaphoresis. Imaging was benign and labs were baseline. Patient was provided instructions to been seen at the emergency room for evaluation of possible cardiomyopathy and further cardiac workup.    Patient has been sick for a week now. She endorses shortness of breath, difficulty breathing, and complications of her asthma. Patient adds that she has been having chest pain since yesterday, stating that her chest begins to burn after some time outside. Her symptoms have affected her sleep, \"keeping [her] up all night\" last night. In the ED, she feels jittery, but her chest pain has resolved after taking nitroglycerine. Patient does not use oxygen at home. She notes her visit to a clinic on Friday (3 days ago) where her chest xray looked good.    Patient describes an allergy to metal and a heart history of: a heart attack 20 years ago, open heart surgery 10 years ago, and blockages in hear heart 4 years ago. She notes that her current symptoms of chest pain feel similar to these blockages.      PHYSICAL EXAM    BP (!) 160/78 (BP Location: Left arm, Cuff Size: Adult Large)   Pulse 80   Temp 97.9  F (36.6  C) (Oral)   Resp 16   Ht 1.6 m (5' 3\")   Wt 95.3 kg (210 lb) "   SpO2 94%   BMI 37.20 kg/m    PHYSICAL EXAM    Constitutional: Well developed, Well nourished, NAD  HENT: Normocephalic, Atraumatic, Bilateral external ears normal, Oropharynx normal, mucous membranes moist, Nose normal. Neck-  Normal range of motion, No tenderness, Supple, No stridor.   Eyes: PERRL, EOMI, Conjunctiva normal, No discharge.   Respiratory: Normal breath sounds, No respiratory distress, No wheezing, Speaks full sentences easily. No cough.   Cardiovascular: Normal heart rate, Regular rhythm, No murmurs Chest wall nontender.    GI:  Soft, No tenderness, No masses, No flank tenderness. No rebound or guarding.  : No cva tenderness    Musculoskeletal: 2+ DP pulses. No edema. No cyanosis. Good range of motion in all major joints. No tenderness to palpation. No tenderness of the CTLS spine.   Integument: Warm, Dry, No erythema, No rash. No petechiae.   Neurologic: Alert & oriented x 3, Normal motor function, Normal sensory function, No focal deficits noted.   Psychiatric: Affect normal, Judgment normal, Mood normal. Cooperative.        LAB:  All pertinent labs reviewed and interpreted.  Results for orders placed or performed during the hospital encounter of 06/09/25   XR Chest Port 1 View    Impression    IMPRESSION:     Cardiac silhouette is unchanged in size. The left ventricular apex remains indistinct due to adjacent mediastinal fat. There are findings of previous median sternotomy and coronary revascularization. The vascular pedicle is normal.    Symmetric lung inflation. There are no airspace or interstitial lung opacities. Diaphragm curvature is preserved. No visible pleural fluid. No pneumothorax.    Intact and aligned sternal wires. Bilateral shoulder osteoarthrosis.   Extra Blue Top Tube   Result Value Ref Range    Hold Specimen JIC    Extra Green Top (Lithium Heparin) Tube   Result Value Ref Range    Hold Specimen JIC    Extra Purple Top Tube   Result Value Ref Range    Hold Specimen JIC     Basic metabolic panel   Result Value Ref Range    Sodium 142 135 - 145 mmol/L    Potassium 3.6 3.4 - 5.3 mmol/L    Chloride 103 98 - 107 mmol/L    Carbon Dioxide (CO2) 26 22 - 29 mmol/L    Anion Gap 13 7 - 15 mmol/L    Urea Nitrogen 11.8 8.0 - 23.0 mg/dL    Creatinine 1.32 (H) 0.51 - 0.95 mg/dL    GFR Estimate 42 (L) >60 mL/min/1.73m2    Calcium 9.1 8.8 - 10.4 mg/dL    Glucose 146 (H) 70 - 99 mg/dL   Result Value Ref Range    Troponin T, High Sensitivity 15 (H) <=14 ng/L   NT-proBNP   Result Value Ref Range    NT-proBNP 1,571 (H) 0 - 624 pg/mL   Influenza A/B, RSV and SARS-CoV2 PCR (COVID-19) Nasopharyngeal    Specimen: Nasopharyngeal; Swab   Result Value Ref Range    Influenza A PCR Negative Negative    Influenza B PCR Negative Negative    RSV PCR Negative Negative    SARS CoV2 PCR Negative Negative   Result Value Ref Range    Magnesium 2.0 1.7 - 2.3 mg/dL   CBC with platelets and differential   Result Value Ref Range    WBC Count 4.2 4.0 - 11.0 10e3/uL    RBC Count 5.12 3.80 - 5.20 10e6/uL    Hemoglobin 15.5 11.7 - 15.7 g/dL    Hematocrit 46.9 35.0 - 47.0 %    MCV 92 78 - 100 fL    MCH 30.3 26.5 - 33.0 pg    MCHC 33.0 31.5 - 36.5 g/dL    RDW 13.8 10.0 - 15.0 %    Platelet Count 193 150 - 450 10e3/uL    % Neutrophils 60 %    % Lymphocytes 28 %    % Monocytes 8 %    % Eosinophils 4 %    % Basophils 1 %    % Immature Granulocytes 0 %    NRBCs per 100 WBC 0 <1 /100    Absolute Neutrophils 2.5 1.6 - 8.3 10e3/uL    Absolute Lymphocytes 1.2 0.8 - 5.3 10e3/uL    Absolute Monocytes 0.3 0.0 - 1.3 10e3/uL    Absolute Eosinophils 0.2 0.0 - 0.7 10e3/uL    Absolute Basophils 0.1 0.0 - 0.2 10e3/uL    Absolute Immature Granulocytes 0.0 <=0.4 10e3/uL    Absolute NRBCs 0.0 10e3/uL   Result Value Ref Range    Troponin T, High Sensitivity 17 (H) <=14 ng/L   ECG 12-LEAD WITH MUSE (LHE)   Result Value Ref Range    Systolic Blood Pressure 151 mmHg    Diastolic Blood Pressure 88 mmHg    Ventricular Rate 95 BPM    Atrial Rate 84 BPM     NJ Interval 166 ms    QRS Duration 66 ms     ms    QTc 487 ms    P Axis 57 degrees    R AXIS 35 degrees    T Axis 29 degrees    Interpretation ECG       Sinus rhythm with Premature supraventricular complexes and with occasional Premature ventricular complexes  Low voltage QRS  Cannot rule out Anteroseptal infarct , age undetermined  Abnormal ECG  When compared with ECG of 04-Jun-2025 14:38,  Premature ventricular complexes are now Present  Premature supraventricular complexes are now Present  Minimal criteria for Anteroseptal infarct are now Present  Nonspecific T wave abnormality now evident in Lateral leads  Confirmed by SEE ED PROVIDER NOTE FOR, ECG INTERPRETATION (4000),  Vipin Headley ([id number]) on 6/9/2025 12:00:47 PM  Also confirmed by SEE ED PROVIDER NOTE FOR, ECG INTERPRETATION (4000),  Nicolasa Mclain ([id number])  on 6/9/2025 12:03:20 PM         RADIOLOGY:  Reviewed all pertinent imaging. Please see official radiology report.  XR Chest Port 1 View   Final Result   IMPRESSION:       Cardiac silhouette is unchanged in size. The left ventricular apex remains indistinct due to adjacent mediastinal fat. There are findings of previous median sternotomy and coronary revascularization. The vascular pedicle is normal.      Symmetric lung inflation. There are no airspace or interstitial lung opacities. Diaphragm curvature is preserved. No visible pleural fluid. No pneumothorax.      Intact and aligned sternal wires. Bilateral shoulder osteoarthrosis.          EKG:    Performed at: 09-Jun-2025 at 11:52 AM    Impression: Sinus rhythm with premature supraventricular complexes and with occasional premature ventricular complexes. Low voltage QRS. Cannot rule out anteroseptal infarct, age undetermined. When compared with ECG of 04-Jun-2025 14:38, premature ventricular complexes are now present, premature supraventricular complexes are now present, minimal criteria for anteroseptal infarct are now present,  nonspecific T wave abnormality now evident in lateral leads.    Rate: 95 BPM  Rhythm: Sinus with premature supraventricular complexes and with occasional premature ventricular complexes.  Axis: 57  35  29  AZ Interval: 166 ms  QRS Interval: 66 ms  QTc Interval: 388/487 ms  ST Changes: None  Comparison: When compared with ECG of 04-Jun-2025 14:38, premature ventricular complexes are now present, premature supraventricular complexes are now present, minimal criteria for anteroseptal infarct are now present, nonspecific T wave abnormality now evident in lateral leads.    I have independently reviewed and interpreted the EKG(s) documented above.             I, Thomas Carreon, am serving as a scribe to document services personally performed by Dr. Keegan Boone, based on my observations and the provider's statements to me. I, Dr. Keegan Boone, attest that Thomas Carreon is acting in a scribe capacity, has observed my performance of the services and has documented them in accordance with my direction.     Keegan Boone MD  [facility] EMERGENCY ROOM  [address] [id number] [phone]       Keegan Boone MD  06/09/25 2594

## 2025-06-10 ENCOUNTER — APPOINTMENT (OUTPATIENT)
Dept: OCCUPATIONAL THERAPY | Facility: CLINIC | Age: 75
End: 2025-06-10
Payer: COMMERCIAL

## 2025-06-10 VITALS
SYSTOLIC BLOOD PRESSURE: 160 MMHG | HEIGHT: 63 IN | TEMPERATURE: 97.8 F | HEART RATE: 66 BPM | RESPIRATION RATE: 48 BRPM | WEIGHT: 210 LBS | OXYGEN SATURATION: 91 % | DIASTOLIC BLOOD PRESSURE: 71 MMHG | BODY MASS INDEX: 37.21 KG/M2

## 2025-06-10 LAB
ANION GAP SERPL CALCULATED.3IONS-SCNC: 10 MMOL/L (ref 7–15)
BUN SERPL-MCNC: 18.1 MG/DL (ref 8–23)
CALCIUM SERPL-MCNC: 9.3 MG/DL (ref 8.8–10.4)
CHLORIDE SERPL-SCNC: 102 MMOL/L (ref 98–107)
CREAT SERPL-MCNC: 1.23 MG/DL (ref 0.51–0.95)
EGFRCR SERPLBLD CKD-EPI 2021: 46 ML/MIN/1.73M2
GLUCOSE SERPL-MCNC: 186 MG/DL (ref 70–99)
HCO3 SERPL-SCNC: 26 MMOL/L (ref 22–29)
POTASSIUM SERPL-SCNC: 4.5 MMOL/L (ref 3.4–5.3)
SODIUM SERPL-SCNC: 138 MMOL/L (ref 135–145)

## 2025-06-10 PROCEDURE — 250N000012 HC RX MED GY IP 250 OP 636 PS 637

## 2025-06-10 PROCEDURE — 97165 OT EVAL LOW COMPLEX 30 MIN: CPT | Mod: GO

## 2025-06-10 PROCEDURE — 250N000013 HC RX MED GY IP 250 OP 250 PS 637: Performed by: FAMILY MEDICINE

## 2025-06-10 PROCEDURE — 250N000013 HC RX MED GY IP 250 OP 250 PS 637

## 2025-06-10 PROCEDURE — 94640 AIRWAY INHALATION TREATMENT: CPT

## 2025-06-10 PROCEDURE — 97535 SELF CARE MNGMENT TRAINING: CPT | Mod: GO

## 2025-06-10 PROCEDURE — 250N000009 HC RX 250

## 2025-06-10 PROCEDURE — 99223 1ST HOSP IP/OBS HIGH 75: CPT | Mod: GC

## 2025-06-10 PROCEDURE — 94640 AIRWAY INHALATION TREATMENT: CPT | Mod: 76

## 2025-06-10 PROCEDURE — 36415 COLL VENOUS BLD VENIPUNCTURE: CPT

## 2025-06-10 PROCEDURE — 82435 ASSAY OF BLOOD CHLORIDE: CPT

## 2025-06-10 PROCEDURE — G0378 HOSPITAL OBSERVATION PER HR: HCPCS

## 2025-06-10 PROCEDURE — 999N000157 HC STATISTIC RCP TIME EA 10 MIN

## 2025-06-10 RX ORDER — AZITHROMYCIN 500 MG/1
500 TABLET, FILM COATED ORAL DAILY
Status: DISCONTINUED | OUTPATIENT
Start: 2025-06-10 | End: 2025-06-10 | Stop reason: HOSPADM

## 2025-06-10 RX ORDER — PREDNISONE INTENSOL 5 MG/ML
40 SOLUTION, CONCENTRATE ORAL DAILY
Qty: 16 ML | Refills: 0 | Status: SHIPPED | OUTPATIENT
Start: 2025-06-11 | End: 2025-06-13

## 2025-06-10 RX ORDER — AZITHROMYCIN 500 MG/1
500 TABLET, FILM COATED ORAL DAILY
Qty: 2 TABLET | Refills: 0 | Status: SHIPPED | OUTPATIENT
Start: 2025-06-10 | End: 2025-06-12

## 2025-06-10 RX ADMIN — IPRATROPIUM BROMIDE AND ALBUTEROL SULFATE 3 ML: .5; 3 SOLUTION RESPIRATORY (INHALATION) at 08:09

## 2025-06-10 RX ADMIN — PREDNISONE INTENSOL 40 MG: 5 SOLUTION, CONCENTRATE ORAL at 12:13

## 2025-06-10 RX ADMIN — LEVOTHYROXINE SODIUM 50 MCG: 0.05 TABLET ORAL at 08:50

## 2025-06-10 RX ADMIN — ASPIRIN 81 MG CHEWABLE TABLET 81 MG: 81 TABLET CHEWABLE at 08:51

## 2025-06-10 RX ADMIN — IPRATROPIUM BROMIDE AND ALBUTEROL SULFATE 3 ML: .5; 3 SOLUTION RESPIRATORY (INHALATION) at 11:13

## 2025-06-10 RX ADMIN — LORATADINE 10 MG: 10 TABLET ORAL at 08:51

## 2025-06-10 RX ADMIN — FLUOXETINE HYDROCHLORIDE 10 MG: 10 CAPSULE ORAL at 08:51

## 2025-06-10 RX ADMIN — Medication 12.5 MG: at 08:51

## 2025-06-10 RX ADMIN — POLYETHYLENE GLYCOL 3350 17 G: 17 POWDER, FOR SOLUTION ORAL at 08:50

## 2025-06-10 ASSESSMENT — ACTIVITIES OF DAILY LIVING (ADL)
ADLS_ACUITY_SCORE: 41
ADLS_ACUITY_SCORE: 41
ADLS_ACUITY_SCORE: 47
ADLS_ACUITY_SCORE: 41
ADLS_ACUITY_SCORE: 47

## 2025-06-10 NOTE — CARE PLAN
06/10/25 1315   Home Oxygen Assessment (RN/RT ONLY)   Does patient have oxygen at home? No   1. SpO2 on room air at rest while awake 92       Oxygen LPM at rest 0   3. SpO2 on room air during Activity/with exercise 88   4. SpO2 with oxygen during activity/with exercise   (Pt refused to put O2 on)       Oxygen LPM during activity/with exercise 0   Does patient qualify for Home O2? No

## 2025-06-10 NOTE — PLAN OF CARE
Pt is AxOX4. Elevated BP and HR slightly tachy in low 100s at times, other vitals stable on 1/2-1 L via nasal cannula with SPO2 goal 88-92%, weaned from 2L. SR on tele. Denying pain, SOB, and chest pain. Regular diet. PIVs are SL. SBA. Sleeping in chair overnight.    Problem: Gas Exchange Impaired  Goal: Optimal Gas Exchange  Outcome: Progressing     Problem: Adult Inpatient Plan of Care  Goal: Optimal Comfort and Wellbeing  Outcome: Progressing

## 2025-06-10 NOTE — PROGRESS NOTES
Pt belonging bag left in room when discharged. RN tried calling pt to update. Phone off and unable to leave voicemail. Belongings at security desk.

## 2025-06-10 NOTE — PROGRESS NOTES
06/10/25 1117   Appointment Info   Signing Clinician's Name / Credentials (OT) Khoa Marr OTR/L   Quick Adds   Quick Adds Certification   Living Environment   People in Home alone   Current Living Arrangements apartment   Home Accessibility no concerns   Transportation Anticipated family or friend will provide   Living Environment Comments Pt uses 4WW at all times for mobility. Pt has tub shower w/ shower chair and grab bars, standard toilets, lift chair, adjustable bed   Self-Care   Usual Activity Tolerance fair   Current Activity Tolerance fair   Equipment Currently Used at Home grab bar, toilet;grab bar, tub/shower;glucometer;shower chair;hospital bed;walker, rolling  (4WW)   Fall history within last six months no   Activity/Exercise/Self-Care Comment Pt typically gets assistance w/ dressing, bathing, and all IADLs at baseline. Pt has PCA for 20 hrs/wk and has a lady stay w/ her overnights as well. Supportive neighbors and management of building, per pt.   General Information   Onset of Illness/Injury or Date of Surgery 06/09/25   Referring Physician Shmuel Estes MD   Patient/Family Therapy Goal Statement (OT) go home today   Additional Occupational Profile Info/Pertinent History of Current Problem Bárbara Perez is a 74 year old female admitted on 6/9/2025. She has a history of COPD, T2DM, GERD, HTN, hypothyroidism, CAD s/p multiple cardiac interventions, and multiple listed allergies. She presented to the ER with escalating SOB and is admitted for suspected COPD exacerbation.   Existing Precautions/Restrictions no known precautions/restrictions   Cognitive Status Examination   Orientation Status orientation to person, place and time   Affect/Mental Status (Cognitive) WNL   Visual Perception   Visual Impairment/Limitations WFL   Sensory   Sensory Quick Adds sensation intact   Pain Assessment   Patient Currently in Pain No   Posture   Posture not impaired   Range of Motion Comprehensive   General Range  of Motion upper extremity range of motion deficits identified   Comment, General Range of Motion limited L shoulder AROM due to past stroke/rotator cuff injury   Strength Comprehensive (MMT)   Comment, General Manual Muscle Testing (MMT) Assessment Residual L side weakness from past stroke   Bed Mobility   Bed Mobility No deficits identified   Assistive Device (Bed Mobility) bed rails   Transfers   Transfers bed-chair transfer;sit-stand transfer;toilet transfer   Transfer Comments SBA   Activities of Daily Living   BADL Assessment/Intervention lower body dressing   Lower Body Dressing Assessment/Training   [place] Level (Lower Body Dressing) minimum assist (75% patient effort)   Clinical Impression   Criteria for Skilled Therapeutic Interventions Met (OT) Yes, treatment indicated   OT Diagnosis decreased ADLs   Influenced by the following impairments COPD   OT Problem List-Impairments impacting ADL activity tolerance impaired;strength   Assessment of Occupational Performance 1-3 Performance Deficits   Identified Performance Deficits dressing, all transfers   Planned Therapy Interventions (OT) ADL retraining;transfer training;strengthening   Clinical Decision Making Complexity (OT) problem focused assessment/low complexity   Risk & Benefits of therapy have been explained evaluation/treatment results reviewed;patient   OT Total Evaluation Time   OT Eval, Low Complexity Minutes (27259) 10   Therapy Certification   Start of Care Date 06/10/25   Certification date from 06/10/25   Certification date to 06/10/25   Medical Diagnosis COPD   OT Goals   Therapy Frequency (OT) One time eval and treatment   OT Predicted Duration/Target Date for Goal Attainment 06/10/25   OT Goals Lower Body Dressing;Bed Mobility;Toilet Transfer/Toileting   OT: Lower Body Dressing Minimal assist;Goal Met   OT: Bed Mobility Modified independent;supine to/from sitting;Goal Met  (w/ bedrail)   OT: Toilet Transfer/Toileting Modified  independent;toilet transfer;Goal Met   Interventions   Interventions Quick Adds Self-Care/Home Management   Self-Care/Home Management   Self-Care/Home Mgmt/ADL, Compensatory, Meal Prep Minutes (04465) 10   Symptoms Noted During/After Treatment (Meal Preparation/Planning Training) none   Treatment Detail/Skilled Intervention Patient on 1/2 L O2 at start of session on room air during activity.  Patient able to pull socks up on her own.  Patient demonstrated safe STS from chair with SBA and use FWW to ambulate ~120 feet in hallway, patient moving well and no LOB.  No signs of SOB, pain, or difficulty with mobility.  O2 sats at 88% after activity with cueing for PLB which increased to over 90% within 30 seconds.  Education on energy conservation for home-patient verbalized understanding.  Patient has good home set up and assistance from PCA.  Patient appears to be at baseline.  Goals met and no further therapy recommended while here.   OT Discharge Planning   OT Plan DC OT   OT Discharge Recommendation (DC Rec) (S)  home with assist   OT Rationale for DC Rec Patient appears to be close to baseline with O2 dropping a bit with activity down to 88%.  Patient has good home set up and good assistance from PCA to help at home.  Patient has all necessary DME including 4 WW.  No further therapy recommended.   OT Brief overview of current status Patient is SBA with mobility, transfers and basic ADLs.  Min assist for dressing, bathing and IADLs   OT Total Distance Amb During Session (feet) 125   Total Session Time   Timed Code Treatment Minutes 10   Total Session Time (sum of timed and untimed services) 20     [facility]                                                                                   OUTPATIENT OCCUPATIONAL THERAPY    PLAN OF TREATMENT FOR OUTPATIENT REHABILITATION   Patient's Last Name, First Name, Bárbara Cevallos YOB: 1950   Provider's Name   [facility]  [facility]   Medical Record No.  [id number]     Onset Date: 06/09/25 Start of Care Date: 06/10/25     Medical Diagnosis:  COPD               OT Diagnosis:  decreased ADLs Certification Dates:  From: 06/10/25  To: 06/10/25     See note for plan of treatment, functional goals, and certification details.    I CERTIFY THE NEED FOR THESE SERVICES FURNISHED UNDER        THIS PLAN OF TREATMENT AND WHILE UNDER MY CARE (Physician co-signature of this document indicates review and certification of the therapy plan).

## 2025-06-10 NOTE — PROGRESS NOTES
Resident/Fellow Attestation   I, Te Polk DO, was present with the medical/ALEJANDRA student who participated in the service and in the documentation of the note.  I have verified the history and personally performed the physical exam and medical decision making.  I agree with the assessment and plan of care as documented in the note.      The patient is doing well and feels much better compared to her initial presentation to ED. At the time of today's visit, the patient  was on 1/2 O2 on NC. If she is able to the wean off the oxygen for the rest of the afternoon, discharge can be later this afternoon or at the latest, tomorrow. See medical student's note below for medication regimen. The plan was discussed with attending physician, Dr. Shmuel Estes.     Te Polk DO  PGY1  Date of Service (when I saw the patient): 06/10/25    [facility]    Progress Note - Hospitalist Service       Date of Admission:  6/9/2025    Assessment & Plan      Bárbara Perez is a 74 year old female admitted on 6/9/2025. She has a history of COPD, T2DM, GERD, HTN, hypothyroidism, CAD s/p multiple cardiac interventions, and multiple listed allergies. She presented to the ER with escalating SOB and is admitted for suspected COPD exacerbation.    Acute hypoxic respiratory failure  COPD exacerbation  Presented via EMS for SOB, found to be hypoxic needing 5 LPM O2. Not on supplemental O2 at baseline. CXR unremarkable. Covid, flu, RSV negative. D-dimer appropriate for age. Improving after steroids, nebs, azithromycin. Weaned down to 0.5L NC.   - Supplemental O2, wean as able; titrate 88-92%  - DuoNebs q4hr PRN  - Continue prednisone burst (end date 6/13)  - Start azithromycin (end date 6/11)  - Walking trial to assess oxygen needs at home.     Chest pain, resolved  S/p nitroglycerin en route with EMS. Chest pain resolved on admission. EKG non-diagnostic, repeat troponin down trending. On exam, pain is reproducible to  "palpation. Chest pain likely sequelae of URI vs MSK. Will continue to monitor closely via symptoms.   - Discontinue cardiac tele    History of CAD  S/p PTCA of the vein graft to obtuse marginal branch  Severe stenosis of the right coronary artery on CTA   Elevated BNP - 1571  Pt has hx of CAD s/p CABG 2010, PTCA in 2017 & 2020. Per annual physical, she reported \"was told by the [facility] that she is allergic to stents - had a stroke after stent was placed.\" She had been advised to undergo surgery for her blockages but declined. Last visit with cardiology on file appears to have been with Dr. Sol in 2020 though may be more recent visits at [facility]. Last echo on file from 2024: EF 60-65%, mild-moderate mitral valve regurgitation, mild-moderate tricuspid valve regurgitation. Here, BNP is 1571.   - PRN nitroglycerin (patient has not used thus far)   - Continue PTA ASA  - Will emphasize follow-up with cardiology     T2DM, well-controlled  A1c 5.8% on 5/9/25. Per last PCP note, glipizide was stopped due to reported jitteriness and given well-controlled blood sugar status. Glucose 142 on presentation. Per med rec, patient has continued to take glipizide with last dose listed as being taken 6/8/25.  - hold PTA glipizide  - AM BMP    Hypothyroidism - PTA levothyroxine 50 mcg TID  Essential HTN - continue PTA metoprolol  Seasonal allergies - continue PTA olopatadine and loratadine       Diet: Combination Diet Regular Diet Adult  DVT Prophylaxis: Enoxaparin (Lovenox) SQ  Talavera Catheter: Not present  Fluids: POI  Lines: None     Cardiac Monitoring: ACTIVE order. Indication: Chest pain/ ACS rule out (24 hours)  Code Status: Full Code    Clinically Significant Risk Factors Present on Admission                 # Drug Induced Platelet Defect: home medication list includes an antiplatelet medication   # Hypertension: Noted on problem list           # Obesity: Estimated body mass index is 37.2 kg/m  as calculated from the " "following:    Height as of this encounter: 1.6 m (5' 3\").    Weight as of this encounter: 95.3 kg (210 lb).       # Financial/Environmental Concerns: none  # Asthma: noted on problem list        Social Drivers of Health   Tobacco Use: High Risk (6/4/2025)    Patient History     Smoking Tobacco Use: Some Days     Smokeless Tobacco Use: Never   Physical Activity: Insufficiently Active (3/15/2021)    Received from [facility]    Exercise Vital Sign     Days of Exercise per Week: 2 days     Minutes of Exercise per Session: 20 min   Stress: Stress Concern Present (3/15/2021)    Received from [facility]    [employer] - Occupational Stress Questionnaire     Feeling of Stress : To some extent         Disposition Plan   Medically Ready for Discharge: Anticipated Today     The patient's care was discussed with the Attending Physician, Dr. Estes.    Fatmata Grajeda  Medical Student  Hospitalist Service  [facility]  Securely message with [employer] (more info)  Text page via [facility] Paging/Directory   ______________________________________________________________________    Interval History   No acute events overnight. Endorses breathing better, denies chest pain. Endorses \"fluttering\" of chest overnight. Says she was able to ambulate independently.   The patient does not use oxygen at home.     Physical Exam   Vital Signs: Temp: 97.8  F (36.6  C) Temp src: Oral BP: (!) 160/71 Pulse: 66   Resp: (!) 48 SpO2: 93 % O2 Device: None (Room air) Oxygen Delivery: 1/2 LPM  Weight: 210 lbs 0 oz    Constitutional: awake, alert, cooperative, no apparent distress, and appears stated age  Respiratory: Fair air movement, expiratory rhonchi in bases, similar to prior exam  Cardiovascular: normal S1 and S2 and no edema  GI: non-tender, non-distended    Data     I have personally reviewed the following data over the past 24 hrs:    N/A  \   N/A   / N/A     138 102 18.1 /  186 (H)   4.5 26 1.23 (H) " \     Trop: 15 (H) BNP: N/A     INR:  N/A PTT:  N/A   D-dimer:  0.67 (H) Fibrinogen:  N/A       Imaging results reviewed over the past 24 hrs:   Recent Results (from the past 24 hours)   XR Chest Port 1 View    Narrative    EXAM: XR CHEST PORT 1 VIEW  LOCATION: [facility]  DATE: 6/9/2025    INDICATION: Shortness of breath  COMPARISON: PA and lateral views of the chest 6/4/2025      Impression    IMPRESSION:     Cardiac silhouette is unchanged in size. The left ventricular apex remains indistinct due to adjacent mediastinal fat. There are findings of previous median sternotomy and coronary revascularization. The vascular pedicle is normal.    Symmetric lung inflation. There are no airspace or interstitial lung opacities. Diaphragm curvature is preserved. No visible pleural fluid. No pneumothorax.    Intact and aligned sternal wires. Bilateral shoulder osteoarthrosis.

## 2025-06-10 NOTE — PROGRESS NOTES
Occupational Therapy Discharge Summary    Reason for therapy discharge:    All goals and outcomes met, no further needs identified.    Progress towards therapy goal(s). See goals on Care Plan in [facility] electronic health record for goal details.  Goals met    Therapy recommendation(s):    No further therapy is recommended.

## 2025-06-10 NOTE — PLAN OF CARE
"Patient sent home with son.   Problem: Adult Inpatient Plan of Care  Goal: Plan of Care Review  Description: The Plan of Care Review/Shift note should be completed every shift.  The Outcome Evaluation is a brief statement about your assessment that the patient is improving, declining, or no change.  This information will be displayed automatically on your shift  note.  Outcome: Met  Goal: Patient-Specific Goal (Individualized)  Description: You can add care plan individualizations to a care plan. Examples of Individualization might be:  \"Parent requests to be called daily at 9am for status\", \"I have a hard time hearing out of my right ear\", or \"Do not touch me to wake me up as it startles  me\".  Outcome: Met  Goal: Absence of Hospital-Acquired Illness or Injury  Outcome: Met  Goal: Optimal Comfort and Wellbeing  Outcome: Met  Goal: Readiness for Transition of Care  Outcome: Met     Problem: Delirium  Goal: Optimal Coping  Outcome: Met  Goal: Improved Behavioral Control  Outcome: Met  Goal: Improved Attention and Thought Clarity  Outcome: Met  Goal: Improved Sleep  Outcome: Met     Problem: Comorbidity Management  Goal: Blood Glucose Levels Within Targeted Range  Outcome: Met     Problem: Gas Exchange Impaired  Goal: Optimal Gas Exchange  Outcome: Met   Goal Outcome Evaluation:                            "

## 2025-06-10 NOTE — PROGRESS NOTES
Physical Therapy: Orders received. Chart reviewed and discussed with care team.? Physical Therapy not indicated due to per OT, pt at mobility baseline and has no skilled PT needs/concerns.? Defer discharge recommendations to care team and nursing.? Will complete orders.    Cathy Santiago, PT, DPT

## 2025-06-10 NOTE — PROGRESS NOTES
Home O2 eval was done.  She refused to put O2 on.  Her O2 at rest was 92% and she got up and moved around the room and it dropped to 88%.  Pt states that if O2 were ordered for her at home, she wouldn't use it.  She says she is on a program at home and they monitor her O2.  She also says that she has a monitor at home that she can use.  Updated the residents.

## 2025-06-10 NOTE — DISCHARGE SUMMARY
"[facility]  Discharge Summary - Medicine & Pediatrics       Date of Admission:  6/9/2025  Date of Discharge:  6/10/2025  Discharging Provider: Dr. Shmuel Estes  Discharge Service: Hospitalist Service    Discharge Diagnoses   Acute hypoxic respiratory failure  COPD exacerbation   Non-cardiac chest pain     Clinically Significant Risk Factors     # Obesity: Estimated body mass index is 37.2 kg/m  as calculated from the following:    Height as of this encounter: 1.6 m (5' 3\").    Weight as of this encounter: 95.3 kg (210 lb).       Discharge Disposition   Discharged to home  Condition at discharge: Stable    Hospital Course   Bárbara Perez is a 74 year old female admitted on 6/9/2025. She has a history of COPD, T2DM, GERD, HTN, hypothyroidism, CAD s/p multiple cardiac interventions. She presented to the ER with escalating SOB and is admitted for suspected COPD exacerbation and chest pain.      Acute hypoxic respiratory failure, resolved  COPD exacerbation  Presented via EMS for SOB, found to be hypoxic. Not on supplemental oxygen at baseline. Infectious work-up was unremarkable. D-dimer appropriate for age. Symptoms and oxygen requirement greatly improved after steroids, nebs, azithromycin. Discharged with course of prednisone (end date 6/13) and azithromycin (end date 6/13). Back to room air oxygen, appropriate saturation for COPD.      Chest pain, resolved  CAD s/p CABG 2010, PTCA 2017 & 2020  Elevated BNP - 1571   Patient endorsed chest pain and received nitroglycerin en route to hospital. On arrival, chest pain resolved. EKG non-diagnostic, repeat troponin down trending. D-dimer appropriate for age. On exam, pain is reproducible to palpation. Acute chest pain likely musculoskeletal in etiology, however, patient does have extensive cardiac history. Recommend ongoing outpatient follow-up with cardiology.     Follow-ups Needed After Discharge   - Follow up with primary care in 1-2 weeks   - " Follow up with cardiology    Consultations This Hospital Stay   PHYSICAL THERAPY ADULT IP CONSULT  OCCUPATIONAL THERAPY ADULT IP CONSULT  CARE MANAGEMENT / SOCIAL WORK IP CONSULT    Code Status   Full Code     The patient was discussed with Dr. Shmuel Darby DO  Resident/Fellow Attestation   IRudy DO, was present with the medical/ALEJANDRA student who participated in the service and in the documentation of the note.  I have verified the history and personally performed the physical exam and medical decision making.  I agree with the assessment and plan of care as documented in the note.      Rudy Darby DO  PGY3   [facility] EMERGENCY ROOM  [address] [place] [id number]  Phone: [phone]  Fax: [phone]  ______________________________________________________________________    Physical Exam   Vital Signs: Temp: 97.8  F (36.6  C) Temp src: Oral BP: (!) 160/71 Pulse: 75   Resp: 20 SpO2: (!) 90 % O2 Device: Nasal cannula Oxygen Delivery: 1/2 LPM  Weight: 210 lbs 0 oz    Constitutional: awake, alert, cooperative, no apparent distress, and appears stated age  Respiratory: Fair air movement, mild expiratory rhonchi in bases, similar to prior exam  Cardiovascular: normal S1 and S2 and no edema  GI: non-tender, non-distended      Primary Care Physician   Tamara Lyle [facility]    Discharge Orders   No discharge procedures on file.    Significant Results and Procedures   Most Recent 3 CBC's:  Recent Labs   Lab Test 06/09/25  1207 06/04/25  1322 12/09/24  1626   WBC 4.2 5.6 4.8   HGB 15.5 16.1* 16.4*   MCV 92 91 90    190 192     Most Recent 3 BMP's:  Recent Labs   Lab Test 06/10/25  0658 06/09/25  1207 06/04/25  1322    142 141   POTASSIUM 4.5 3.6 4.0   CHLORIDE 102 103 108   CO2 26 26 29   BUN 18.1 11.8 13   CR 1.23* 1.32* 1.20*   ANIONGAP 10 13 4   KARINE 9.3 9.1 9.1   * 146* 165*     Most  Recent D-dimer:  Recent Labs   Lab Test 06/09/25  1555   DD 0.67*     7-Day Micro Results       Collected Updated Procedure Result Status      06/09/2025 1228 06/09/2025 1316 Influenza A/B, RSV and SARS-CoV2 PCR (COVID-19) Nasopharyngeal [[id number]]    Swab from Nasopharyngeal    Final result Component Value   Influenza A PCR Negative   Influenza B PCR Negative   RSV PCR Negative   SARS CoV2 PCR Negative   NEGATIVE: SARS-CoV-2 (COVID-19) RNA not detected, presumed negative.            06/04/2025 1254 06/04/2025 1316 Streptococcus A Rapid Screen w/Reflex to PCR - Clinic Collect [[id number]]   Swab from Throat    Final result Component Value   Group A Strep antigen Negative            06/04/2025 1254 06/04/2025 1923 COVID-19 Virus (Coronavirus) by PCR Nose [[id number]]    Swab from Nose    Final result Component Value   SARS CoV2 PCR Negative   NEGATIVE: SARS-CoV-2 (COVID-19) RNA not detected, presumed negative.            06/04/2025 1254 06/04/2025 1918 Group A Streptococcus PCR Throat Swab [[id number]]    Swab from Throat    Final result Component Value   Group A strep by PCR Not Detected                ,   Results for orders placed or performed during the hospital encounter of 06/09/25   XR Chest Port 1 View    Narrative    EXAM: XR CHEST PORT 1 VIEW  LOCATION: [facility]  DATE: 6/9/2025    INDICATION: Shortness of breath  COMPARISON: PA and lateral views of the chest 6/4/2025      Impression    IMPRESSION:     Cardiac silhouette is unchanged in size. The left ventricular apex remains indistinct due to adjacent mediastinal fat. There are findings of previous median sternotomy and coronary revascularization. The vascular pedicle is normal.    Symmetric lung inflation. There are no airspace or interstitial lung opacities. Diaphragm curvature is preserved. No visible pleural fluid. No pneumothorax.    Intact and aligned sternal wires. Bilateral shoulder osteoarthrosis.        Discharge Medications   Current Discharge Medication List        CONTINUE these medications which have NOT CHANGED    Details   aspirin 81 mg chewable tablet [ASPIRIN 81 MG CHEWABLE TABLET] Chew 81 mg daily.       FLUoxetine (PROZAC) 10 MG tablet Take 10 mg by mouth daily.      fluticasone (FLONASE) 50 mcg/actuation nasal spray Spray 2 sprays in nostril 2 times daily.      glipiZIDE (GLUCOTROL) 5 MG tablet Take 1.25 mg by mouth 2 times daily (before meals).      !! ipratropium - albuterol 0.5 mg/2.5 mg/3 mL (DUONEB) 0.5-2.5 (3) MG/3ML neb solution Take 1 vial (3 mLs) by nebulization every 6 hours as needed for shortness of breath, wheezing or cough  Qty: 90 mL, Refills: 0    Associated Diagnoses: COPD exacerbation (H)      !! ipratropium-albuterol (DUO-NEB) 0.5-2.5 mg/3 mL nebulizer Take 3 mLs by nebulization 3 times daily.      levothyroxine (SYNTHROID, LEVOTHROID) 50 MCG tablet [LEVOTHYROXINE (SYNTHROID, LEVOTHROID) 50 MCG TABLET] Take 50 mcg by mouth 3 (three) times a day.       loratadine (CLARITIN) 10 mg tablet [LORATADINE (CLARITIN) 10 MG TABLET] Take 10 mg by mouth daily.      metoprolol tartrate (LOPRESSOR) 25 MG tablet [METOPROLOL TARTRATE (LOPRESSOR) 25 MG TABLET] Take 0.5 tablets (12.5 mg total) by mouth 2 (two) times a day.  Qty: 60 tablet, Refills: 0    Associated Diagnoses: Essential hypertension      nitroglycerin (NITROSTAT) 0.4 MG SL tablet [NITROGLYCERIN (NITROSTAT) 0.4 MG SL TABLET] Place 1 tablet (0.4 mg total) under the tongue every 5 (five) minutes as needed for chest pain.  Qty: 30 tablet, Refills: 0    Associated Diagnoses: NSTEMI (non-ST elevated myocardial infarction) (H)      olopatadine (PATADAY) 0.2 % ophthalmic solution Place 1 drop into both eyes at bedtime.      !! blood glucose test (ACCU-CHEK ACTIVE TEST) strips [BLOOD GLUCOSE TEST (ACCU-CHEK ACTIVE TEST) STRIPS] Use 1 each As Directed as needed. Dispense brand per patient's insurance at pharmacy discretion.  Qty: 100 each,  Refills: 11      !! blood glucose test (GLUCOSE BLOOD) strips [BLOOD GLUCOSE TEST (GLUCOSE BLOOD) STRIPS] Test once daily      compressor, for nebulizer Yogesh [COMPRESSOR, FOR NEBULIZER YOGESH] To use with albuterol as needed for wheezing and shortness of breath  Qty: 1 Device, Refills: 0      generic lancets (ACCU-CHEK MULTICLIX LANCET) [GENERIC LANCETS (ACCU-CHEK MULTICLIX LANCET)] TESTING ONCE DAILY       !! - Potential duplicate medications found. Please discuss with provider.        Allergies   Allergies   Allergen Reactions    Atorvastatin Shortness Of Breath and Swelling     Was previously deleted but on current encounter (6/20/17), patient told me to add as an allergy.     Cefdinir Shortness Of Breath and Itching    Dye [External Allergen Needs Reconciliation - See Comment] Other (See Comments)     Allergy to all dyes.  Cannot take any medications with dyes or color. Causes rapid heartrate, sweating.  Patient unsure of other symptoms but states they are severe.    Ezetimibe Shortness Of Breath and Swelling     Spoke with patient 9/8/20, stated reaction was shoulder pain, and she never experienced shortness of breath from zetia.     Latex Anaphylaxis    Penicillin V Shortness Of Breath and Rash    Amoxicillin     Cetirizine Unknown     unknown    Chocolate Laxative [Senna] Unknown    Codeine Unknown    Doxycycline      Patient states that she is unable to take the doxycycline prescribed due to the fact that it has color, she is allergic to ALL dyes.      Egg Derived [Chicken-Derived Products (Egg)] Unknown    Fenofibrate Unknown    Influenza Virus Vaccines [Influenza Vaccines] Unknown    Iodine Unknown    Ioxaglate Sodium [Ioxaglate] Itching    Latex Unknown    Oxycodone Unknown    Peanut Oil Unknown    Tizanidine Hallucination    Toradol [Ketorolac] Unknown     Ulcers, takes aspirin daily at home and Aleve as needed

## 2025-06-12 ENCOUNTER — PATIENT OUTREACH (OUTPATIENT)
Dept: CARE COORDINATION | Facility: CLINIC | Age: 75
End: 2025-06-12
Payer: COMMERCIAL

## 2025-06-12 NOTE — PROGRESS NOTES
"Clinic Care Coordination Contact  Transitions of Care Outreach  Chief Complaint   Patient presents with    Clinic Care Coordination - Post [facility]       Most Recent Admission Date: 6/9/2025   Most Recent Admission Diagnosis: COPD exacerbation (H) - J44.1     Most Recent Discharge Date: 6/10/2025   Most Recent Discharge Diagnosis: COPD exacerbation (H) - J44.1     Transitions of Care Assessment    Discharge Assessment  How are you doing now that you are home?: \" Still not well \"  How are your symptoms? (Red Flag symptoms escalate to triage hotline per guidelines): Unchanged  Do you know how to contact your clinic care team if you have future questions or changes to your health status? : Yes  Does the patient have their discharge instructions? : Yes  Does the patient have questions regarding their discharge instructions? : No  Were you started on any new medications or were there changes to any of your previous medications? : No  Does the patient have all of their medications?: (S) No (see comment) (\" Dr marx give me it \")  Do you have questions regarding any of your medications? : No  Do you have all of your needed medical supplies or equipment (DME)?  (i.e. oxygen tank, CPAP, cane, etc.): Yes    Post-op ([facility] CTA Only)  If the patient had a surgery or procedure, do they have any questions for a nurse?: No             Follow up Plan     Discharge Follow-Up  Discharge follow up appointment scheduled in alignment with recommended follow up timeframe or Transitions of Risk Category? (Low = within 30 days; Moderate= within 14 days; High= within 7 days): No    No future appointments.    Outpatient Plan as outlined on AVS reviewed with patient.    For any urgent concerns, please contact our 24 hour nurse triage line: [phone] ([phone])       Denise Campbell MA              "